# Patient Record
Sex: FEMALE | Race: WHITE | NOT HISPANIC OR LATINO | Employment: FULL TIME | URBAN - METROPOLITAN AREA
[De-identification: names, ages, dates, MRNs, and addresses within clinical notes are randomized per-mention and may not be internally consistent; named-entity substitution may affect disease eponyms.]

---

## 2022-11-03 ENCOUNTER — HOSPITAL ENCOUNTER (EMERGENCY)
Facility: HOSPITAL | Age: 42
Discharge: LEFT BEFORE DISCHARGE | End: 2022-11-03

## 2022-11-03 VITALS
TEMPERATURE: 96.7 F | WEIGHT: 180 LBS | HEART RATE: 83 BPM | SYSTOLIC BLOOD PRESSURE: 127 MMHG | RESPIRATION RATE: 16 BRPM | DIASTOLIC BLOOD PRESSURE: 95 MMHG | OXYGEN SATURATION: 100 %

## 2022-11-03 LAB
ATRIAL RATE: 70 BPM
P AXIS: 56 DEGREES
PR INTERVAL: 136 MS
QRS AXIS: 35 DEGREES
QRSD INTERVAL: 88 MS
QT INTERVAL: 414 MS
QTC INTERVAL: 447 MS
T WAVE AXIS: 14 DEGREES
VENTRICULAR RATE: 70 BPM

## 2022-11-03 NOTE — ED NOTES
ekg performed @ [address] [facility], [facility]  11/03/22 9075

## 2022-11-03 NOTE — ED NOTES
Attempt to call pt back to triage to initiate FNP  Did not answer       Robb Sears RN  11/03/22 [id number]

## 2022-11-03 NOTE — ED NOTES
Pt called multiple times in the waiting room   Pt also called at home, no answer     Pearl Habermann, RN  11/03/22 [id number]

## 2023-06-28 ENCOUNTER — OFFICE VISIT (OUTPATIENT)
Dept: FAMILY MEDICINE CLINIC | Facility: CLINIC | Age: 43
End: 2023-06-28
Payer: COMMERCIAL

## 2023-06-28 VITALS
OXYGEN SATURATION: 96 % | HEIGHT: 64 IN | WEIGHT: 189 LBS | SYSTOLIC BLOOD PRESSURE: 118 MMHG | BODY MASS INDEX: 32.27 KG/M2 | TEMPERATURE: 95 F | DIASTOLIC BLOOD PRESSURE: 74 MMHG | HEART RATE: 63 BPM

## 2023-06-28 DIAGNOSIS — F41.9 ANXIETY AND DEPRESSION: ICD-10-CM

## 2023-06-28 DIAGNOSIS — R10.12 LEFT UPPER QUADRANT ABDOMINAL PAIN: ICD-10-CM

## 2023-06-28 DIAGNOSIS — Z90.3 HISTORY OF SLEEVE GASTRECTOMY: ICD-10-CM

## 2023-06-28 DIAGNOSIS — I72.9 ANEURYSM (HCC): ICD-10-CM

## 2023-06-28 DIAGNOSIS — F32.A ANXIETY AND DEPRESSION: ICD-10-CM

## 2023-06-28 DIAGNOSIS — Z00.00 ANNUAL PHYSICAL EXAM: Primary | ICD-10-CM

## 2023-06-28 PROBLEM — K44.9 HIATAL HERNIA: Status: ACTIVE | Noted: 2023-03-14

## 2023-06-28 PROBLEM — J30.9 ALLERGIC RHINITIS: Status: ACTIVE | Noted: 2023-03-22

## 2023-06-28 PROBLEM — K21.9 GASTROESOPHAGEAL REFLUX DISEASE WITHOUT ESOPHAGITIS: Status: ACTIVE | Noted: 2022-12-12

## 2023-06-28 PROCEDURE — 99386 PREV VISIT NEW AGE 40-64: CPT | Performed by: PHYSICIAN ASSISTANT

## 2023-06-28 RX ORDER — CITALOPRAM 20 MG/1
TABLET ORAL
COMMUNITY
Start: 2023-06-09

## 2023-06-28 RX ORDER — FAMOTIDINE 40 MG/1
40 TABLET, FILM COATED ORAL 2 TIMES DAILY PRN
COMMUNITY
Start: 2023-06-26 | End: 2024-06-25

## 2023-06-28 RX ORDER — CLOPIDOGREL BISULFATE 75 MG/1
75 TABLET ORAL
COMMUNITY
Start: 2022-08-10

## 2023-06-28 RX ORDER — BIOTIN 10 MG
10 TABLET ORAL DAILY
COMMUNITY

## 2023-06-28 RX ORDER — CROMOLYN SODIUM 5.2 MG
1 AEROSOL, SPRAY WITH PUMP (ML) NASAL DAILY
COMMUNITY

## 2023-07-28 ENCOUNTER — TELEPHONE (OUTPATIENT)
Dept: NEUROSURGERY | Facility: CLINIC | Age: 43
End: 2023-07-28

## 2023-07-28 ENCOUNTER — CONSULT (OUTPATIENT)
Dept: NEUROSURGERY | Facility: CLINIC | Age: 43
End: 2023-07-28
Payer: COMMERCIAL

## 2023-07-28 VITALS
DIASTOLIC BLOOD PRESSURE: 68 MMHG | BODY MASS INDEX: 32.44 KG/M2 | WEIGHT: 190 LBS | OXYGEN SATURATION: 98 % | HEIGHT: 64 IN | SYSTOLIC BLOOD PRESSURE: 104 MMHG | TEMPERATURE: 98 F | RESPIRATION RATE: 18 BRPM | HEART RATE: 68 BPM

## 2023-07-28 DIAGNOSIS — I67.9 INTRACRANIAL VASCULAR STENOSIS: ICD-10-CM

## 2023-07-28 DIAGNOSIS — I72.9 ANEURYSM (HCC): Primary | ICD-10-CM

## 2023-07-28 PROCEDURE — 99244 OFF/OP CNSLTJ NEW/EST MOD 40: CPT | Performed by: PHYSICIAN ASSISTANT

## 2023-07-28 RX ORDER — PANTOPRAZOLE SODIUM 40 MG/1
TABLET, DELAYED RELEASE ORAL
COMMUNITY
Start: 2023-07-17

## 2023-07-28 RX ORDER — DIPHENOXYLATE HYDROCHLORIDE AND ATROPINE SULFATE 2.5; .025 MG/1; MG/1
1 TABLET ORAL DAILY
COMMUNITY

## 2023-07-28 RX ORDER — AMOXICILLIN 250 MG
1 CAPSULE ORAL AS NEEDED
COMMUNITY
Start: 2023-04-20 | End: 2024-04-19

## 2023-07-28 RX ORDER — SPIRONOLACTONE 50 MG/1
TABLET, FILM COATED ORAL
COMMUNITY
Start: 2023-05-02

## 2023-07-28 NOTE — LETTER
July 28, 2023    Patient Jazzy Gonzales 1980 is in our office for new patient appointment. Please urgently fax over MRA Head & MRI Brain done 8/12/2019 to [phone].       Thank [address] [place] [address]

## 2023-07-28 NOTE — PROGRESS NOTES
Neurosurgery Office Note  Eri Ross 38 y.o. 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 Geovanna Gore or sooner if she develops worsening symptoms or red flag signs  • Continue plavix for now, will address need to continue this at next visit  • Patient left discs with our office, will need to give those back at her next visit. Information from discs uploaded for review. Diagnoses and all orders for this visit:    Aneurysm ([address])  -     Ambulatory Referral to Neurosurgery  -     Cancel: MRA head w wo contrast; Future  -     CT VENOGRAM BRAIN; Future    Intracranial vascular stenosis  -     Cancel: MRA head w wo contrast; Future  -     CT VENOGRAM BRAIN; Future    Other orders  -     pantoprazole (PROTONIX) 40 mg tablet  -     senna-docusate sodium (SENOKOT S) 8.6-50 mg per tablet;  Take 1 tablet by mouth if needed  -     spironolactone (ALDACTONE) 50 mg tablet  -     multivitamin (THERAGRAN) TABS; Take 1 tablet by mouth daily          I have spent a total time of 40 minutes on 07/28/23 in caring for this patient including Patient and family education, Impressions, Counseling / Coordination of care, Documenting in the medical record, Reviewing / ordering tests, medicine, procedures   and Obtaining or reviewing history  . CHIEF COMPLAINT    Chief Complaint   Patient presents with   • Consult     NP S/P STENT NO IMAGING       HISTORY    History of Present Illness     38y.o. year old female with past medical history significant for intracranial vascular stenosis with venous aneurysm s/p stenting and embolization who presents as a new consultation at the request of her PCP to establish care. Patient states since 2010 she has had a whooshing sensation in her left ear. She saw many specialists and underwent several images and no one was able to figure out what the cause was. 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 She underwent stenting of the sinus as well as stent assisted embolization of the aneurysm on August 5, 2022. Her symptoms resolved after this procedure. She is looking to establish care with neurosurgery closer to home. She offers no new complaints today. She brings discs of imaging with her to be uploaded into the system. See Discussion    REVIEW OF SYSTEMS    Review of Systems   Neurological: Positive for speech difficulty (trouble finding words ). NP S/P STENT NO IMAGING  Chief Complaint: S/P STENT FOR   ANEURYSM/STENOSIS,PLACED 8/2022 IN [facility]  PT STILL ON PLAVIX FOR 1 YR POST SX        Psychiatric/Behavioral: Positive for confusion. All other systems reviewed and are negative. ROS obtained by MA. Reviewed. See HPI.      Meds/Allergies     Current Outpatient Medications   Medication Sig Dispense Refill   • Biotin 10 MG TABS Take 10 mg by mouth daily     • citalopram (CeleXA) 20 mg tablet      • clopidogrel (PLAVIX) 75 mg tablet Take 75 mg by mouth     • cromolyn (NASALCHROM) 5.2 MG/ACT nasal spray 1 spray into each nostril daily     • famotidine (PEPCID) 40 MG tablet Take 40 mg by mouth 2 (two) times a day as needed     • multivitamin (THERAGRAN) TABS Take 1 tablet by mouth daily     • pantoprazole (PROTONIX) 40 mg tablet      • senna-docusate sodium (SENOKOT S) 8.6-50 mg per tablet Take 1 tablet by mouth if needed     • spironolactone (ALDACTONE) 50 mg tablet        No current facility-administered medications for this visit. No Known Allergies    PAST HISTORY    No past medical history on file. Past Surgical History:   Procedure Laterality Date   • GASTRIC BYPASS  2016   • REVISION GASTRIC RESTRICTIVE PROCEDURE FOR MORBID OBESITY  04/2023       Social History     Tobacco Use   • Smoking status: Never     Passive exposure: Never   • Smokeless tobacco: Never   Substance Use Topics   • Alcohol use: Never   • Drug use: Never       No family history on file. Above history personally reviewed. EXAM    Vitals:Blood pressure 104/68, pulse 68, temperature 98 °F (36.7 °C), temperature source Temporal, resp. rate 18, height 5' 4" (1.626 m), weight 86.2 kg (190 lb), SpO2 98 %. ,Body mass index is 32.61 kg/m². Physical Exam  Constitutional:       General: She is awake. Appearance: Normal appearance. HENT:      Head: Normocephalic and atraumatic. Eyes:      Extraocular Movements: Extraocular movements intact and EOM normal.      Conjunctiva/sclera: Conjunctivae normal.   Cardiovascular:      Rate and Rhythm: Normal rate. Pulmonary:      Effort: Pulmonary effort is normal. No respiratory distress. Skin:     General: Skin is warm and dry. Neurological:      Mental Status: She is alert and oriented to person, place, and time. Motor: Motor strength is normal.     Coordination: Finger-Nose-Finger Test normal.      Gait: Gait is intact.    Psychiatric:         Attention and Perception: Attention and perception normal.         Mood and Affect: Mood and affect normal.         Speech: Speech normal. Behavior: Behavior normal. Behavior is cooperative. Thought Content: Thought content normal.         Cognition and Memory: Cognition and memory normal.         Judgment: Judgment normal.         Neurologic Exam     Mental Status   Oriented to person, place, and time. Follows 1 step commands. Attention: normal. Concentration: normal.   Speech: speech is normal   Level of consciousness: alert  Knowledge: good. Normal comprehension. Cranial Nerves     CN III, IV, VI   Extraocular motions are normal.   CN III: no CN III palsy  CN VI: no CN VI palsy  Nystagmus: none   Diplopia: none  Ophthalmoparesis: none  Upgaze: normal  Downgaze: normal  Conjugate gaze: present    CN V   Right facial sensation deficit: none  Left facial sensation deficit: none    CN VII   Right facial weakness: none  Left facial weakness: none    CN VIII   Hearing: intact    CN IX, X   CN IX normal.   CN X normal.     CN XI   Right trapezius strength: normal  Left trapezius strength: normal    CN XII   CN XII normal.     Motor Exam   Muscle bulk: normal  Overall muscle tone: normal  Right arm pronator drift: absent  Left arm pronator drift: absent    Strength   Strength 5/5 throughout. Gait, Coordination, and Reflexes     Gait  Gait: normal    Coordination   Finger to nose coordination: normal    Tremor   Resting tremor: absent  Intention tremor: absent  Action tremor: absent    Reflexes   Right : 2+  Left : 2+        MEDICAL DECISION MAKING    Imaging Studies:     No results found. I have personally reviewed pertinent reports.    and I have personally reviewed pertinent films in PACS

## 2023-07-28 NOTE — TELEPHONE ENCOUNTER
Sent urgent fax to [facility], [facility]. requesting Medical request [phone]    Images uploaded-Jacqueline BARBOZA to bring [facility]     CV cerebral angiogram done 2/17/2020 (* requested reported @ [phone])  MRA Head done 8/12/2019 (faxed request to [address] @ [phone])  MRI Brain 8/12/2019 (faxed request to [address] @ [phone])    Reports received and sent to [facility]

## 2023-07-28 NOTE — ASSESSMENT & PLAN NOTE
· Reports history of aneurysm however location of this is unclear  · States she has coils placed 8/2022 at [facility]; looking to establish care closer to home  · Will order MRA head w wo contrast for further evaluation  · Remain on plavix for now until repeat imaging obtained  · MA working on obtaining hospital records from [facility], to be obtained prior to next visit. Patient will bring in whatever information she may have at home.   · Return to the office in 2-4 weeks upon completion of imaging with AP and endovascular attending or sooner if worsening symptoms or red flag signs  ·

## 2023-07-28 NOTE — ASSESSMENT & PLAN NOTE
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 Destin Quiñones or sooner if she develops worsening symptoms or red flag signs  • Continue plavix for now, will address need to continue this at next visit  • Patient left discs with our office, will need to give those back at her next visit. Information from discs uploaded for review.

## 2023-08-14 ENCOUNTER — HOSPITAL ENCOUNTER (OUTPATIENT)
Dept: CT IMAGING | Facility: CLINIC | Age: 43
Discharge: HOME | End: 2023-08-14
Payer: COMMERCIAL

## 2023-08-14 DIAGNOSIS — I72.9 ANEURYSM (HCC): ICD-10-CM

## 2023-08-14 DIAGNOSIS — I67.9 INTRACRANIAL VASCULAR STENOSIS: ICD-10-CM

## 2023-08-14 PROCEDURE — G1004 CDSM NDSC: HCPCS

## 2023-08-14 PROCEDURE — 70496 CT ANGIOGRAPHY HEAD: CPT

## 2023-08-14 RX ADMIN — IOHEXOL 85 ML: 350 INJECTION, SOLUTION INTRAVENOUS at 11:34

## 2023-08-21 ENCOUNTER — TELEPHONE (OUTPATIENT)
Dept: FAMILY MEDICINE CLINIC | Facility: CLINIC | Age: 43
End: 2023-08-21

## 2023-08-21 NOTE — TELEPHONE ENCOUNTER
Please remove Azalia as PCP. Pt is now under the care of Dr. Ashley Wise with [facility].

## 2023-08-31 PROBLEM — D64.9 ABSOLUTE ANEMIA: Status: ACTIVE | Noted: 2021-01-21

## 2023-08-31 RX ORDER — PROCHLORPERAZINE MALEATE 5 MG/1
TABLET ORAL
COMMUNITY
End: 2023-09-06

## 2023-08-31 RX ORDER — OXYCODONE HYDROCHLORIDE 5 MG/1
TABLET ORAL
COMMUNITY
End: 2023-09-06

## 2023-08-31 NOTE — PROGRESS NOTES
Diagnoses and all orders for this visit:    Encounter for gynecological examination without abnormal finding    Mixed stress and urge urinary incontinence  -     Ambulatory referral to Physical Therapy; Future  -     Urinalysis with microscopic  -     Urine culture    Encounter for screening mammogram for malignant neoplasm of breast  -     Mammo screening bilateral w 3d & cad; Future      Follow up with PT for  Incontinence   Perineal hygiene reviewed   Weight bearing exercises minium of 150 mins/weekly advised. Kegel exercises recommended. SBE encouraged, ASCCP guidelines reviewed. Condoms encouraged with all sexual activity to prevent STI's. Gardisil vaccines recommended up to age 39  Calcium/ Vit D dietary requirements discussed,   Advised to call with any issues,  all concerns & questions addressed. See provided information in your after visit summary     F/U Annually and PRN      Health Maintenance:    Last PAP: 4/7/21 Neg/Neg  Next PAP Due: 2024    Last Mammogram:   8/18/23 @ [facility]  Life time Abbey Escoto % not documented , Density B scattered areas of fibroglandular density , Bi-Rads 1 Negative  Next Mammogram: order given    Last Colonoscopy: Not on file    advised age 41    Gardisil:  Not completed Gardasil 9 was advised for prevention of HPV-related disease. We discussed risks/benefits, SE's/AE's at length and all questions were answered. Written info was provided for review. The patient agrees to consider and is aware she may call to schedule injection #1 at any time. Declines       Subjective    CC: Yearly Exam , New patient      Rica Borges is a 41 y.o. female here for an annual exam. No obstetric history on file. GYN hx includes:  2 c sections,   No personal Hx of breast, cervical, ovarian or colon CA. Family hx of: MGM with BC    Medically stable, reports no changes in medical Hx, follows with PMD    Patient's last menstrual period was 08/28/2023 (exact date).   Her menstrual cycles are regular every 28-30 days. She denies issues with bleeding during her menses. She does have heavy flow d/t Plavix, is hoping to come off of Plavix in the next few weeks, has upcoming follow up appts  Denies history of abnormal pap smear. She denies breast concerns, abnormal vaginal discharge, vaginal itching, odor, irritation, christopher dysfunction, urinary symptoms, pelvic pain, or dyspareunia today. Reports 1 year of urinary frequency, urgency, leaks urine, wears pads. Will send urine for UTI. Advised Salo's daily information provided in AVS   She is sexually active. Monogamous relationship. Her current method of contraception includes  male vasectomy . Denies any issues with her BCM. She does not want STD testing today. Denies intimate partner violence    Elementary      History reviewed. No pertinent past medical history.   Past Surgical History:   Procedure Laterality Date   • GASTRIC BYPASS  2016   • REVISION GASTRIC RESTRICTIVE PROCEDURE FOR MORBID OBESITY  04/2023       Immunization History   Administered Date(s) Administered   • COVID-19 MODERNA VACC 0.5 ML IM 03/23/2021, 04/20/2021, 11/16/2021   • INFLUENZA 10/20/2021, 11/01/2022   • Td (adult), adsorbed 07/01/2016   • Tuberculin Skin Test-PPD Intradermal 03/22/2021       Family History   Problem Relation Age of Onset   • No Known Problems Daughter    • No Known Problems Daughter    • Breast cancer Maternal Grandmother      Social History     Tobacco Use   • Smoking status: Never     Passive exposure: Never   • Smokeless tobacco: Never   Vaping Use   • Vaping Use: Never used   Substance Use Topics   • Alcohol use: Never   • Drug use: Never       Current Outpatient Medications:   •  Biotin 10 MG TABS, Take 10 mg by mouth daily, Disp: , Rfl:   •  citalopram (CeleXA) 20 mg tablet, , Disp: , Rfl:   •  clopidogrel (PLAVIX) 75 mg tablet, Take 75 mg by mouth, Disp: , Rfl:   •  cromolyn (NASALCHROM) 5.2 MG/ACT nasal spray, 1 spray into each nostril daily, Disp: , Rfl:   •  multivitamin (THERAGRAN) TABS, Take 1 tablet by mouth daily, Disp: , Rfl:   •  senna-docusate sodium (SENOKOT S) 8.6-50 mg per tablet, Take 1 tablet by mouth if needed, Disp: , Rfl:   •  spironolactone (ALDACTONE) 50 mg tablet, , Disp: , Rfl:   Patient Active Problem List    Diagnosis Date Noted   • Intracranial vascular stenosis 2023   • Annual physical exam 2023   • Left upper quadrant abdominal pain 2023   • Allergic rhinitis 2023   • Hiatal hernia 2023   • History of sleeve gastrectomy 02/10/2023   • Aneurysm ([address]) 2022   • Gastroesophageal reflux disease without esophagitis 2022   • Anxiety and depression 06/15/2022   • Absolute anemia 2021       No Known Allergies    OB History    Para Term  AB Living   2 0 0 0 0 2   SAB IAB Ectopic Multiple Live Births   0 0 0 0 2      # Outcome Date GA Lbr Chandler/2nd Weight Sex Delivery Anes PTL Lv   2             1                Obstetric Comments     2 x  c- section        Vitals:    23 1100   BP: 118/76   BP Location: Left arm   Patient Position: Sitting   Cuff Size: Large   Weight: 83.5 kg (184 lb)   Height: 5' 4" (1.626 m)     Body mass index is 31.58 kg/m². Review of Systems     Constitutional: Negative for chills, fatigue, fever, headaches, visual disturbances, and unexpected weight change. Respiratory: Negative for cough, & shortness of breath. Cardiovascular: Negative for chest pain. .    Gastrointestinal: Negative for Abd pain, nausea & vomiting, constipation and diarrhea. Genitourinary: Negative for difficulty urinating, dysuria, hematuria, dyspareunia, unusual vaginal bleeding or discharge  Skin: Negative skin changes    Physical Exam     Constitutional: Alert & Oriented x3, well-developed and well-nourished. No distress. HENT: Atraumatic, Normocephalic, Conjunctivae clear  Neck: Normal range of motion. Neck supple.  No thyromegaly, mass, nodules or tenderness  Pulmonary: Effort normal.   Abdominal: Soft. No tenderness or masses  Musculoskeletal: Normal ROM  Skin: Warm & Dry  Psychological: Normal mood, thought content, behavior & judgement     Breasts:   Right: tissue soft without masses, tenderness, skin changes or nipple discharge. No areas of erythema or pain. No subclavicular, axillary, pectoral adenopathy  Left:  tissue soft without masses, tenderness, skin changes or nipple discharge. No areas of erythema or pain. No subclavicular, axillary, pectoral adenopathy    Pelvic exam was performed with patient supine, lithotomy position. Labia: Negative rash, tenderness, lesion or injury on the right labia. Negative rash, tenderness, lesion or injury on the left labia. Tissues are very dry,    Urethral meatus:  Negative for  tenderness, inflammation or discharge. Uterus: not deviated, enlarged, fixed or tender. Cervix: No CMT, no discharge or friability. Right adnexa: no mass, no tenderness and no fullness. Left adnexa: no mass, no tenderness and no fullness. Vagina: No erythema, tenderness, masses, or foreign body in the vagina. No signs of injury around the vagina. No unusual vaginal discharge   Perineum without lesions, signs of injury, erythema or swelling. Inguinal Canal:        Right: No inguinal adenopathy or hernia present. Left: No inguinal adenopathy or hernia present.

## 2023-08-31 NOTE — TELEPHONE ENCOUNTER
08/31/23 3:56 PM        The office's request has been received, reviewed, and the patient chart updated. The PCP has successfully been removed with a patient attribution note. This message will now be completed.         Thank you  Trina Rios

## 2023-08-31 NOTE — PATIENT INSTRUCTIONS
Breast Self Exam for Women   AMBULATORY CARE:   A breast self-exam (BSE)  is a way to check your breasts for lumps and other changes. Regular BSEs can help you know how your breasts normally look and feel. Most breast lumps or changes are not cancer, but you should always have them checked by a healthcare provider. Why you should do a BSE:  Breast cancer is the most common type of cancer in women. Even if you have mammograms, you may still want to do a BSE regularly. If you know how your breasts normally feel and look, it may help you know when to contact your healthcare provider. Mammograms can miss some cancers. You may find a lump during a BSE that did not show up on a mammogram.  When you should do a BSE:  If you have periods, you may want to do your BSE 1 week after your period ends. This is the time when your breasts may be the least swollen, lumpy, or tender. You can do regular BSEs even if you are breastfeeding or have breast implants. Call your doctor if:   You find any lumps or changes in your breasts. You have breast pain or fluid coming from your nipples. You have questions or concerns about your condition or care. How to do a BSE:       Look at your breasts in a mirror. Look at the size and shape of each breast and nipple. Check for swelling, lumps, dimpling, scaly skin, or other skin changes. Look for nipple changes, such as a nipple that is painful or beginning to pull inward. Gently squeeze both nipples and check to see if fluid (that is not breast milk) comes out of them. If you find any of these or other breast changes, contact your healthcare provider. Check your breasts while you sit or  the following 3 positions:    [place] your arms down at your sides. Raise your hands and join them behind your head. Put firm pressure with your hands on your hips. Bend slightly forward while you look at your breasts in the mirror. Lie down and feel your breasts.   When you lie down, your breast tissue spreads out evenly over your chest. This makes it easier for you to feel for lumps and anything that may not be normal for your breasts. Do a BSE on one breast at a time. Place a small pillow or towel under your left shoulder. Put your left arm behind your head. Use the 3 middle fingers of your right hand. Use your fingertip pads, on the top of your fingers. Your fingertip pad is the most sensitive part of your finger. Use small circles to feel your breast tissue. Use your fingertip pads to make dime-sized, overlapping circles on your breast and armpits. Use light, medium, and firm pressure. First, press lightly. Second, press with medium pressure to feel a little deeper into the breast. Last, use firm pressure to feel deep within your breast.    Examine your entire breast area. Examine the breast area from above the breast to below the breast where you feel only ribs. Make small circles with your fingertips, starting in the middle of your armpit. Make circles going up and down the breast area. Continue toward your breast and all the way across it. Examine the area from your armpit all the way over to the middle of your chest (breastbone). Stop at the middle of your chest.    Move the pillow or towel to your right shoulder, and put your right arm behind your head. Use the 3 fingertip pads of your left hand, and repeat the above steps to do a BSE on your right breast.  What else you can do to check for breast problems or cancer:  Talk to your healthcare provider about mammograms. A mammogram is an x-ray of your breasts to screen for breast cancer or other problems. Your provider can tell you the benefits and risks of mammograms. The first mammogram is usually at age 39 or 48. Your provider may recommend you start at 36 or younger if your risk for breast cancer is high. Mammograms usually continue every 1 to 2 years until age 76.        Follow up with your doctor as directed:  Write down your questions so you remember to ask them during your visits. © Copyright Kelly Loges 2022 Information is for End User's use only and may not be sold, redistributed or otherwise used for commercial purposes. The above information is an  only. It is not intended as medical advice for individual conditions or treatments. Talk to your doctor, nurse or pharmacist before following any medical regimen to see if it is safe and effective for you. Wellness Visit for Adults   AMBULATORY CARE:   A wellness visit  is when you see your healthcare provider to get screened for health problems. Your healthcare provider will also give you advice on how to stay healthy. Write down your questions so you remember to ask them. Ask your healthcare provider how often you should have a wellness visit. What happens at a wellness visit:  Your healthcare provider will ask about your health, and your family history of health problems. This includes high blood pressure, heart disease, and cancer. He or she will ask if you have symptoms that concern you, if you smoke, and about your mood. You may also be asked about your intake of medicines, supplements, food, and alcohol. Any of the following may be done: Your weight  will be checked. Your height may also be checked so your body mass index (BMI) can be calculated. Your BMI shows if you are at a healthy weight. Your blood pressure  and heart rate will be checked. Your temperature may also be checked. Blood and urine tests  may be done. Blood tests may be done to check your cholesterol levels. Abnormal cholesterol levels increase your risk for heart disease and stroke. You may also need a blood or urine test to check for diabetes if you are at increased risk. Urine tests may be done to look for signs of an infection or kidney disease. A physical exam  includes checking your heartbeat and lungs with a stethoscope.  Your healthcare provider may also check your skin to look for sun damage. Screening tests  may be recommended. A screening test is done to check for diseases that may not cause symptoms. The screening tests you may need depend on your age, gender, family history, and lifestyle habits. For example, colorectal screening may be recommended if you are 48years old or older. Screening tests you need if you are a woman:   A Pap smear  is used to screen for cervical cancer. Pap smears are usually done every 3 to 5 years depending on your age. You may need them more often if you have had abnormal Pap smear test results in the past. Ask your healthcare provider how often you should have a Pap smear. A mammogram  is an x-ray of your breasts to screen for breast cancer. Experts recommend mammograms every 2 years starting at age 48 years. You may need a mammogram at age 52 years or younger if you have an increased risk for breast cancer. Talk to your healthcare provider about when you should start having mammograms and how often you need them. Vaccines you may need:   Get an influenza vaccine  every year. The influenza vaccine protects you from the flu. Several types of viruses cause the flu. The viruses change over time, so new vaccines are made each year. Get a tetanus-diphtheria (Td) booster vaccine  every 10 years. This vaccine protects you against tetanus and diphtheria. Tetanus is a severe infection that may cause painful muscle spasms and lockjaw. Diphtheria is a severe bacterial infection that causes a thick covering in the back of your mouth and throat. Get a human papillomavirus (HPV) vaccine  if you are female and aged 23 to 32 or male 23 to 24 and never received it. This vaccine protects you from HPV infection. HPV is the most common infection spread by sexual contact. HPV may also cause vaginal, penile, and anal cancers. Get a pneumococcal vaccine  if you are aged 72 years or older.  The pneumococcal vaccine is an injection given to protect you from pneumococcal disease. Pneumococcal disease is an infection caused by pneumococcal bacteria. The infection may cause pneumonia, meningitis, or an ear infection. Get a shingles vaccine  if you are 60 or older, even if you have had shingles before. The shingles vaccine is an injection to protect you from the varicella-zoster virus. This is the same virus that causes chickenpox. Shingles is a painful rash that develops in people who had chickenpox or have been exposed to the virus. How to eat healthy:  My Plate is a model for planning healthy meals. It shows the types and amounts of foods that should go on your plate. Fruits and vegetables make up about half of your plate, and grains and protein make up the other half. A serving of dairy is included on the side of your plate. The amount of calories and serving sizes you need depends on your age, gender, weight, and height. Examples of healthy foods are listed below:  Eat a variety of vegetables  such as dark green, red, and orange vegetables. You can also include canned vegetables low in sodium (salt) and frozen vegetables without added butter or sauces. Eat a variety of fresh fruits , canned fruit in 100% juice, frozen fruit, and dried fruit. Include whole grains. At least half of the grains you eat should be whole grains. Examples include whole-wheat bread, wheat pasta, brown rice, and whole-grain cereals such as oatmeal.    Eat a variety of protein foods such as seafood (fish and shellfish), lean meat, and poultry without skin (turkey and chicken). Examples of lean meats include pork leg, shoulder, or tenderloin, and beef round, sirloin, tenderloin, and extra lean ground beef. Other protein foods include eggs and egg substitutes, beans, peas, soy products, nuts, and seeds. Choose low-fat dairy products such as skim or 1% milk or low-fat yogurt, cheese, and cottage cheese. Limit unhealthy fats  such as butter, hard margarine, and shortening. Exercise:  Exercise at least 30 minutes per day on most days of the week. Some examples of exercise include walking, biking, dancing, and swimming. You can also fit in more physical activity by taking the stairs instead of the elevator or parking farther away from stores. Include muscle strengthening activities 2 days each week. Regular exercise provides many health benefits. It helps you manage your weight, and decreases your risk for type 2 diabetes, heart disease, stroke, and high blood pressure. Exercise can also help improve your mood. Ask your healthcare provider about the best exercise plan for you. General health and safety guidelines:   Do not smoke. Nicotine and other chemicals in cigarettes and cigars can cause lung damage. Ask your healthcare provider for information if you currently smoke and need help to quit. E-cigarettes or smokeless tobacco still contain nicotine. Talk to your healthcare provider before you use these products. Limit alcohol. A drink of alcohol is 12 ounces of beer, 5 ounces of wine, or 1½ ounces of liquor. Lose weight, if needed. Being overweight increases your risk of certain health conditions. These include heart disease, high blood pressure, type 2 diabetes, and certain types of cancer. Protect your skin. Do not sunbathe or use tanning beds. Use sunscreen with a SPF 15 or higher. Apply sunscreen at least 15 minutes before you go outside. Reapply sunscreen every 2 hours. Wear protective clothing, hats, and sunglasses when you are outside. Drive safely. Always wear your seatbelt. Make sure everyone in your car wears a seatbelt. A seatbelt can save your life if you are in an accident. Do not use your cell phone when you are driving. This could distract you and cause an accident. Pull over if you need to make a call or send a text message. Practice safe sex. Use latex condoms if are sexually active and have more than one partner.  Your healthcare provider may recommend screening tests for sexually transmitted infections (STIs). Wear helmets, lifejackets, and protective gear. Always wear a helmet when you ride a bike or motorcycle, go skiing, or play sports that could cause a head injury. Wear protective equipment when you play sports. Wear a lifejacket when you are on a boat or doing water sports. © Copyright Thana Givens 2022 Information is for End User's use only and may not be sold, redistributed or otherwise used for commercial purposes. The above information is an  only. It is not intended as medical advice for individual conditions or treatments. Talk to your doctor, nurse or pharmacist before following any medical regimen to see if it is safe and effective for you. HPV (Human Papillomavirus)   AMBULATORY CARE:   Human Papillomavirus (HPV)  is the name for a group of viruses that can infect your skin or other parts of your body. HPV is the most common infection spread by sexual contact. It can also be spread from a mother to her baby during delivery. Common symptoms include the following:   Painless warts in your mouth or on your genitals    Genital or anal discharge, bleeding, itching, or pain    Pain when you urinate    Call your doctor if:   You have new or worsening symptoms. You have questions or concerns about your condition or care. HPV diagnosis:  Your healthcare provider may use a vinegar liquid to help diagnose HPV genital warts. Women 27to 72years old can be checked for HPV during regular cervical cancer screenings. An HPV test checks for certain types of HPV that can cause changes in cervical cells. Without treatment, the changed cells can become cancer. An HPV test can be done every 5 years if the results show no infection. The test can be done with or without a Pap smear. A Pap smear checks for cancer or for abnormal cells that can become cancer.  You may be tested for HPV if you have mouth or throat cancer. Treatment:  HPV cannot be cured, but an infection may go away on its own in about 2 years without causing problems. If the infection continues, some types of HPV can lead to health conditions that need to be treated. Examples include warts and certain cancers, especially squamous cell carcinoma (SCC). HPV-linked SCCs commonly develop in the anus, throat (called oropharyngeal cancer), cervix, vagina, penis, or mouth. HPV can also cause a type of cervical cancer called adenocarcinoma. Symptoms of any of these conditions may not develop for several years after you were exposed to HPV. You will need to be monitored closely. Ask your healthcare provider for more information about monitoring, conditions caused by HPV, and available treatments. Prevent an HPV infection:  HPV is usually spread through sexual activity. The following can help prevent infection:  Ask about the HPV vaccine. The HPV vaccine is given to females and males, usually at 6or 15years of age. It can be given from 9 years through 39years of age, if needed. It is most effective if given before sexual activity begins. Use a new condom, contraceptive barrier, or dental dam each time you have sex. This includes oral, vaginal, and anal sex. Talk to your healthcare provider if you have any questions about what to use or how to use it. Follow up with your doctor as directed:  Write down your questions so you remember to ask them during your visits. © Copyright Delia Mcelroy 2022 Information is for End User's use only and may not be sold, redistributed or otherwise used for commercial purposes. The above information is an  only. It is not intended as medical advice for individual conditions or treatments. Talk to your doctor, nurse or pharmacist before following any medical regimen to see if it is safe and effective for you.   Kegel Exercises for Women   AMBULATORY CARE:   Kegel exercises  help strengthen your pelvic muscles. Pelvic muscles hold your pelvic organs, such as your bladder and uterus, in place. Kegel exercises help prevent or control certain conditions, such as urine incontinence (leakage) or uterine prolapse. Call your doctor or physical therapist if:   You cannot feel your muscles tighten or relax. You continue to leak urine. You have questions or concerns about your condition or care. Use the correct muscles:  Pelvic muscles are the muscles you use to control urine flow. To target these muscles, stop and start the flow of urine several times. This will help you become familiar with how it feels to tighten and relax these muscles. How to do Kegel exercises:   Get into a comfortable position. You may lie down, stand up, or sit down to do these exercises. When you first try to do these exercises, it may be easier if you lie down. Tighten or squeeze your pelvic muscles slowly. It may feel like you are trying to hold back urine or gas. Hold this position for 3 seconds. Relax for 3 seconds. Repeat this cycle 10 times. Do not hold your breath when you do Kegel exercises. Keep your stomach, back, and leg muscles relaxed. Do 10 sets of Kegel exercises, at least 3 times a day. When you know how to do Kegel exercises, use different positions. This will help to strengthen your pelvic muscles as much as possible. You can do these exercises while you lie on the floor, watch TV, or while you stand. Tighten your pelvic muscles before you sneeze, cough, or lift to prevent urine leakage. You may notice improved bladder control within about 6 weeks. Follow up with your doctor or physical therapist as directed:  Write down your questions so you remember to ask them during your visits. © Copyright Kaia Velásquez 2022 Information is for End User's use only and may not be sold, redistributed or otherwise used for commercial purposes. The above information is an  only.  It is not intended as medical advice for individual conditions or treatments. Talk to your doctor, nurse or pharmacist before following any medical regimen to see if it is safe and effective for you. Perineal Hygiene      Your vaginal naturally takes care of its self, it is a self washing system, the less you mess the healthier it will be     No soaps or feminine wash to the vulva, these products can cause dermitis, bacterial infections and other vulvar problems. Use only water to cleanse, or water with Dove or [employer] if necessary. No scented lotions or products are advised in or near your vulva. Use only coconut oil for moisture if needed. No douching this may cause imbalance in your vaginal PH and further issues. If you wear panty liners, you may apply a thin coating of Vaseline, A&D ointment or coconut oil to the vulvar tissues as a skin barrier     [place] underware, loose fitting clothing  Only perfume-free, dye-free laundry detergent, use a second rinse cycle   Avoid fabric softeners/dryer sheets. Partner should avoid the same products as well. Over the counter probiotic to restore vaginal dylon may be helpful as well, take daily. You may also look into Boric Acid vaginal suppositories to restore vaginal PH balance for up to 2 weeks as directed on the box. You may not use these if you are pregnant      For vaginal dryness: You may use:     Coconut oil (organic, pure, unscented) as needed for moisture or lubrication. ( Do not use if allergic)       Replens moisture restore external comfort gel daily ( use as directed on the box)        Replens long lasting vaginal moisturizer  ( use as directed on the box)         For Vaginal Lubrication:          You may use:     Coconut oil (organic, pure, unscented) as a lubricant or another scent-free lubricant (Astroglide, Uberlube) if needed.   Do not use coconut oil or silicone if using a condom as this may break down the integrity of the condom and cause an unplanned pregnancy              Do not use coconut oil if allergic               Replens silky smooth lubricant, premium silicone based lubricant for intercourse. ( use as directed, a small amount will provide an enhanced natural feeling)     Any premium over the counter vaginal lubricant water or silicone based. Silicone based will have more staying power. Prophylactic NSAID therapy for Painful or Heavy menses     Ibuprofen or Naproxen (chose 1 or the other, do not take both), Dose as noted on the box. Typically Ibuprofen dose is 600 mg, (3 tablets) every 6-8 hours. Typically Naproxen dose is 500 mg every 12 hours. Start taking medication 2 days prior to onset of menses and continue taking through the first 3 days of menses. Make sure you take consistently this is important  You need to take with food to decrease any gastrointestinal upset effects    This is proven therapy to reduce you flow and cramping by 50 %    Life style changes that have a positive effect on painful and heavy periods are as follows   Daily physical exercise    Increase fiber, fresh fruits and vegetables in your diet    Increase daily water intake    Heating pads(do not apply directly to skin, apply over clothing or towel)   Warm Baths   Relaxation techniques, meditation, massage, yoga and mindfulness     These are all suggestion for improving your sense of frustrations with your menstrual cycle and improving your overall wellness and lifestyle     Urinary Incontinence   WHAT YOU NEED TO KNOW:   What is urinary incontinence (UI)? Urinary incontinence (UI) is when you lose control of your bladder. UI develops because your bladder cannot store or empty urine properly. The 3 most common types of UI are stress incontinence, urge incontinence, or both. What are the signs and symptoms of UI? You feel like your bladder does not empty completely when you urinate.     You urinate often and need to urinate immediately. You leak urine when you sleep, or you wake up with the urge to urinate. You leak urine when you cough, sneeze, exercise, or laugh. How is UI treated? Medicines  can help strengthen your bladder control. Electrical stimulation  is used to send a small amount of electrical energy to your pelvic floor muscles. This helps control your bladder function. Electrodes may be placed outside your body or in your rectum. For women, the electrodes may be placed in the vagina. A bulking agent  may be injected into the wall of your urethra to make it thicker. This helps keep your urethra closed and decreases urine leakage. Devices  such as a clamp, pessary, or tampon may help stop urine leaks. Ask your healthcare provider for more information about these and other devices. Surgery  may be needed if other treatments do not work. Several types of surgery can help improve your bladder control. Ask your healthcare provider for more information about the surgery you may need. How can I manage my symptoms? Do pelvic muscle exercises often. Your pelvic muscles help you stop urinating. Squeeze these muscles tight for 5 seconds, then relax for 5 seconds. Gradually work up to squeezing for 10 seconds. Do 3 sets of 15 repetitions a day, or as directed. This will help strengthen your pelvic muscles and improve bladder control. Keep a UI record. Write down how often you leak urine and how much you leak. Make a note of what you were doing when you leaked urine. Train your bladder. Go to the bathroom at set times, such as every 2 hours, even if you do not feel the urge to go. You can also try to hold your urine when you feel the urge to go. For example, hold your urine for 5 minutes when you feel the urge to go. As that becomes easier, hold your urine for 10 minutes. Drink liquids as directed.   Ask your healthcare provider how much liquid to drink each day and which liquids are best for you. You may need to limit the amount of liquid you drink to help control your urine leakage. Do not drink any liquid right before you go to bed. Limit or do not have drinks that contain caffeine or alcohol. Prevent constipation. Eat a variety of high-fiber foods. Good examples are high-fiber cereals, beans, vegetables, and whole-grain breads. Prune juice may help make your bowel movement softer. Walking is the best way to trigger your intestines to have a bowel movement. Exercise regularly and maintain a healthy weight. Ask your healthcare provider how much you should weigh and about the best exercise plan for you. Weight loss and exercise will decrease pressure on your bladder and help you control your leakage. Ask him or her to help you create a weight loss plan if you are overweight. Use a catheter as directed  to help empty your bladder. A catheter is a tiny, plastic tube that is put into your bladder to drain your urine. Your healthcare provider may tell you to use a catheter to prevent your bladder from getting too full and leaking urine. Go to behavior therapy as directed. Behavior therapy may be used to help you learn to control your urge to urinate. When should I call my doctor? You have severe pain. You are confused or cannot think clearly. You have a fever. You see blood in your urine. You have pain when you urinate. You have new or worse pain, even after treatment. Your mouth feels dry or you have vision changes. Your urine is cloudy or smells bad. You have questions or concerns about your condition or care.       We discussed UTI prevention at length: Vitamin C, probiotics, cranberry supplements, AZO, D-mannose, avoid bladder irritants (coffee, soda, tea, alcohol), avoid smoking, avoid holding bladder for prolonged periods of time, void immediately before and after sexual activity, Replens or coconut oil for vaginal dryness, hydrate with at least 64 oz water daily.

## 2023-09-06 ENCOUNTER — OFFICE VISIT (OUTPATIENT)
Dept: OBGYN CLINIC | Facility: CLINIC | Age: 43
End: 2023-09-06
Payer: COMMERCIAL

## 2023-09-06 VITALS
BODY MASS INDEX: 31.41 KG/M2 | HEIGHT: 64 IN | SYSTOLIC BLOOD PRESSURE: 118 MMHG | DIASTOLIC BLOOD PRESSURE: 76 MMHG | WEIGHT: 184 LBS

## 2023-09-06 DIAGNOSIS — N39.46 MIXED STRESS AND URGE URINARY INCONTINENCE: ICD-10-CM

## 2023-09-06 DIAGNOSIS — Z12.31 ENCOUNTER FOR SCREENING MAMMOGRAM FOR MALIGNANT NEOPLASM OF BREAST: ICD-10-CM

## 2023-09-06 DIAGNOSIS — Z01.419 ENCOUNTER FOR GYNECOLOGICAL EXAMINATION WITHOUT ABNORMAL FINDING: Primary | ICD-10-CM

## 2023-09-06 LAB
BACTERIA UR QL AUTO: NORMAL /HPF
BILIRUB UR QL STRIP: NEGATIVE
CLARITY UR: CLEAR
COLOR UR: NORMAL
GLUCOSE UR STRIP-MCNC: NEGATIVE MG/DL
HGB UR QL STRIP.AUTO: NEGATIVE
KETONES UR STRIP-MCNC: NEGATIVE MG/DL
LEUKOCYTE ESTERASE UR QL STRIP: NEGATIVE
NITRITE UR QL STRIP: NEGATIVE
NON-SQ EPI CELLS URNS QL MICRO: NORMAL /HPF
PH UR STRIP.AUTO: 7 [PH]
PROT UR STRIP-MCNC: NEGATIVE MG/DL
RBC #/AREA URNS AUTO: NORMAL /HPF
SP GR UR STRIP.AUTO: 1.01 (ref 1–1.03)
UROBILINOGEN UR STRIP-ACNC: <2 MG/DL
WBC #/AREA URNS AUTO: NORMAL /HPF

## 2023-09-06 PROCEDURE — S0610 ANNUAL GYNECOLOGICAL EXAMINA: HCPCS | Performed by: OBSTETRICS & GYNECOLOGY

## 2023-09-06 PROCEDURE — 87086 URINE CULTURE/COLONY COUNT: CPT | Performed by: OBSTETRICS & GYNECOLOGY

## 2023-09-06 PROCEDURE — 81001 URINALYSIS AUTO W/SCOPE: CPT | Performed by: OBSTETRICS & GYNECOLOGY

## 2023-09-06 NOTE — PROGRESS NOTES
C/ sections   LMP: 2023  PMB:  SA:  YES   HPV:NO   Birth control:  Vasectomy   Last pap: Not on file  Last mammo: Not on file:  Last colonoscopy: Not on file  Last Dexa: Not on file  Family History:    PGM- breast cancer (D)

## 2023-09-08 LAB — BACTERIA UR CULT: NORMAL

## 2023-09-11 ENCOUNTER — TELEPHONE (OUTPATIENT)
Dept: NEUROSURGERY | Facility: CLINIC | Age: 43
End: 2023-09-11

## 2023-09-11 ENCOUNTER — OFFICE VISIT (OUTPATIENT)
Dept: NEUROSURGERY | Facility: CLINIC | Age: 43
End: 2023-09-11
Payer: COMMERCIAL

## 2023-09-11 VITALS
DIASTOLIC BLOOD PRESSURE: 78 MMHG | HEART RATE: 58 BPM | HEIGHT: 64 IN | SYSTOLIC BLOOD PRESSURE: 120 MMHG | TEMPERATURE: 97.9 F | WEIGHT: 184 LBS | BODY MASS INDEX: 31.41 KG/M2 | OXYGEN SATURATION: 99 %

## 2023-09-11 DIAGNOSIS — I72.9 ANEURYSM (HCC): Primary | ICD-10-CM

## 2023-09-11 DIAGNOSIS — I67.9 INTRACRANIAL VASCULAR STENOSIS: ICD-10-CM

## 2023-09-11 PROCEDURE — 99214 OFFICE O/P EST MOD 30 MIN: CPT | Performed by: NEUROLOGICAL SURGERY

## 2023-09-11 NOTE — TELEPHONE ENCOUNTER
9/11/23 patient will call neurosurgery for a follow up after she obtains her Venogram study from [facility] as per Provider Emerita at check out on 9/11/23. Patient was provided medical records phone number to obtain disc that she needs from [facility]. Disc that were here at the office was returned to patient.

## 2023-09-11 NOTE — PROGRESS NOTES
Assessment/Plan:    Aneurysm ([address])  As addressed in HPI    Intracranial vascular stenosis  As addressed in HPI    8/14/23 CT Venogram Brain Status post left transverse/sigmoid sinus stent placement and endovascular coiling of the venous aneurysm. Beam-hardening artifact emanates from the venous coil. Along the anterior margin of the stent, at the junction of the transverse and sigmoid sinus,  there is venous narrowing although the venous sinus remains patent    Plan   Dr Parvin Alan met with patient advised   · She continue with orderes as per vascular surgeon,  Dr. Frank Vergara from [facility]. · Contact this office prn to re-establish care prn If she is no longer under care with the original surgeon or in and emergency situation report to the closest ED department . · Reviewed Red flag symptoms. , if develop report to closest ED         Subjective: Returns after initial appointment 7/28/23 to establish care with a neurovascular surgeon closer to home. CT venogram ordered     Patient ID: Valdemar Jensen is a 41 y.o. 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 As per consult provider notes since 2010 she has had a whooshing sensation in her left ear. She saw many specialists and underwent several images and no one was able to figure out what the cause was.    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 She underwent stenting of the sinus as well as stent assisted embolization of the aneurysm on August 5, 2022. Her symptoms resolved after this procedure. She is looking to establish care with neurosurgery closer to home. Today this visit she reports    Continue with whooshing in the left ear , intermittent episodes   Noise resolved after the surgery in 2022 the around June 2023 started again. The whooshing sound is minor compared to preoperative. Inciting factors for whooshing sound in the left ear are stress, certain positions,, tilting head to left and exertional.   Reports episodes of blurry vision or feeling of wanting to pass, causing her to want to sit down and wait ithe sensation out. The sensation occurs more in the evening and usually when standing. She mentioned being in contact with the surgeon  Dr. Alma Zaidi from [facility], and questioned if she can stop plavix. He advised before decision made to stop plavix and with current symptoms , ordered an MRV and scheduled a f/u Tele medicine appointment for October 18 2023      A CT venogram was ordered  For updated assessment . 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 HENT: Negative. Eyes: Positive for visual disturbance (blur vision occasionlly). Respiratory: Negative. Cardiovascular: Negative. Gastrointestinal: Negative. Endocrine: Negative. Genitourinary: Negative. Musculoskeletal: Positive for neck pain (right sidr base of the neck near the shoulder) and neck stiffness. Neurological: Positive for syncope (feel like she might faint occasionally), speech difficulty (trouble finding words ) and headaches (occasionlly).  Negative for dizziness, seizures and numbness. NP S/P STENT NO IMAGING  Chief Complaint: S/P STENT FOR   ANEURYSM/STENOSIS,[facility]  PT STILL ON PLAVIX FOR 1 YR POST SX        Psychiatric/Behavioral: Positive for confusion (sometimes). The patient is nervous/anxious (occasionlly). All other systems reviewed and are negative. Meds/Allergies     Current Outpatient Medications   Medication Sig Dispense Refill   • Biotin 10 MG TABS Take 10 mg by mouth daily     • citalopram (CeleXA) 20 mg tablet      • clopidogrel (PLAVIX) 75 mg tablet Take 75 mg by mouth     • cromolyn (NASALCHROM) 5.2 MG/ACT nasal spray 1 spray into each nostril daily     • multivitamin (THERAGRAN) TABS Take 1 tablet by mouth daily     • senna-docusate sodium (SENOKOT S) 8.6-50 mg per tablet Take 1 tablet by mouth if needed     • spironolactone (ALDACTONE) 50 mg tablet        No current facility-administered medications for this visit. No Known Allergies    PAST HISTORY    History reviewed. No pertinent past medical history.     Past Surgical History:   Procedure Laterality Date   • GASTRIC BYPASS  2016   • REVISION GASTRIC RESTRICTIVE PROCEDURE FOR MORBID OBESITY  04/2023       Social History     Tobacco Use   • Smoking status: Never     Passive exposure: Never   • Smokeless tobacco: Never   Vaping Use   • Vaping Use: Never used   Substance Use Topics   • Alcohol use: Never   • Drug use: Never       Family History   Problem Relation Age of Onset   • No Known Problems Daughter    • No Known Problems Daughter    • Breast cancer Maternal Grandmother        The following portions of the patient's history were reviewed and updated as appropriate: allergies, current medications, past family history, past medical history, past social history, past surgical history and problem list.      EXAM    Vitals:Blood pressure 120/78, pulse 58, temperature 97.9 °F (36.6 °C), temperature source Temporal, height 5' 4" (1.626 m), weight 83.5 kg (184 lb), last menstrual period 08/28/2023, SpO2 99 %. ,Body mass index is 31.58 kg/m². Physical Exam  Vitals and nursing note reviewed. Constitutional:       General: She is not in acute distress. Appearance: Normal appearance. She is not ill-appearing, toxic-appearing or diaphoretic. HENT:      Head: Normocephalic and atraumatic. Pulmonary:      Effort: Pulmonary effort is normal. No respiratory distress. Musculoskeletal:      Right lower leg: No edema. Left lower leg: No edema. Skin:     General: Skin is warm and dry. Neurological:      General: No focal deficit present. Mental Status: She is alert and oriented to person, place, and time. Sensory: No sensory deficit. Motor: Motor strength is normal.No weakness. Coordination: Coordination normal.      Gait: Gait is intact. Gait normal.      Deep Tendon Reflexes: Reflexes normal.   Psychiatric:         Mood and Affect: Mood normal.         Behavior: Behavior normal.         Neurologic Exam     Mental Status   Oriented to person, place, and time. Level of consciousness: alert    Motor Exam     Strength   Strength 5/5 throughout. Sensory Exam   Light touch normal.     Gait, Coordination, and Reflexes     Gait  Gait: normal      Imaging Studies    CT VENOGRAM BRAIN    Result Date: 8/17/2023  Narrative: CTV BRAIN - WITH AND WITHOUT CONTRAST INDICATION: I72.9: Aneurysm of unspecified site I67.9: Cerebrovascular disease, unspecified history of pulsatile tinnitus with venous aneurysm at the junction of the sigmoid and left transverse sinus treated with coiling and venous stent placement. COMPARISON:   None. TECHNIQUE:  Routine noncontrast CT brain followed by axial 0.625 mm imaging after administration of intravenous contrast.  MIP and 3D reconstructions performed. 3D rendering was performed on an independent workstation. Radiation dose length product (DLP) for this visit:  6185 mGy-cm .   This examination, like all CT scans performed in the [facility], was performed utilizing techniques to minimize radiation dose exposure, including the use of iterative reconstruction and automated exposure control. IV Contrast:  85 mL of iohexol (OMNIPAQUE) IMAGE QUALITY: Beam-hardening artifact from endovascular coiling. FINDINGS: NONCONTRAST BRAIN: PARENCHYMA:  No intracranial mass, mass effect or midline shift. No CT signs of acute infarction. No acute parenchymal hemorrhage. VENTRICLES AND EXTRA-AXIAL SPACES:  Normal for the patient's age. VISUALIZED ORBITS: Normal visualized orbits. PARANASAL SINUSES: Normal visualized paranasal sinuses. VASCULATURE: DURAL VENOUS SINUSES: Status post stent placement beginning in the lateral aspect of the left transverse sinus extending to the transverse and sigmoid junction on the left with placement of endovascular venous coil. Flow is maintained through the stent device. Distal/anterior to the stent, at the junction of the system transverse and sigmoid sinus, there is vascular narrowing. ARTERIAL STRUCTURES: Limited evaluation of the arterial structures secondary to venous bolus timing. No arterial abnormality identified. BONY STRUCTURES:  Unremarkable bony structures. Impression: Status post left transverse/sigmoid sinus stent placement and endovascular coiling of the venous aneurysm. Beam-hardening artifact emanates from the venous coil. Along the anterior margin of the stent, at the junction of the transverse and sigmoid sinus,  there is venous narrowing although the venous sinus remains patent. Workstation performed: [id number]       I have personally reviewed pertinent reports.    and I have personally reviewed pertinent films in PACS    I have spent a total time of 30 minutes on 09/11/23 in caring for this patient including Diagnostic results, Risks and benefits of tx options, Instructions for management, Patient and family education, Importance of tx compliance, Risk factor reductions, Impressions, Counseling / Coordination of care, Documenting in the medical record, Reviewing / ordering tests, medicine, procedures  , Obtaining or reviewing history   and Communicating with other healthcare professionals .

## 2023-09-12 NOTE — ASSESSMENT & PLAN NOTE
As addressed in HPI    8/14/23 CT Venogram Brain Status post left transverse/sigmoid sinus stent placement and endovascular coiling of the venous aneurysm. Beam-hardening artifact emanates from the venous coil. Along the anterior margin of the stent, at the junction of the transverse and sigmoid sinus,  there is venous narrowing although the venous sinus remains patent    Plan   Dr Angel Jung met with patient advised   · She continue with orderes as per vascular surgeon,  Dr. Echo Templeton from [facility]. · Contact this office prn to re-establish care prn If she is no longer under care with the original surgeon or in and emergency situation report to the closest ED department . · Reviewed Red flag symptoms.  , if develop report to closest ED

## 2023-09-28 ENCOUNTER — HOSPITAL ENCOUNTER (OUTPATIENT)
Dept: MRI IMAGING | Facility: HOSPITAL | Age: 43
End: 2023-09-28
Payer: COMMERCIAL

## 2023-09-28 DIAGNOSIS — H93.A9 PULSATILE TINNITUS: ICD-10-CM

## 2023-09-28 DIAGNOSIS — Z00.00 ENCOUNTER FOR PREVENTIVE HEALTH EXAMINATION: ICD-10-CM

## 2023-09-28 PROCEDURE — A9585 GADOBUTROL INJECTION: HCPCS | Performed by: RADIOLOGY

## 2023-09-28 PROCEDURE — 70546 MR ANGIOGRAPH HEAD W/O&W/DYE: CPT

## 2023-09-28 PROCEDURE — 70544 MR ANGIOGRAPHY HEAD W/O DYE: CPT

## 2023-09-28 PROCEDURE — G1004 CDSM NDSC: HCPCS

## 2023-09-28 RX ORDER — GADOBUTROL 604.72 MG/ML
8 INJECTION INTRAVENOUS
Status: COMPLETED | OUTPATIENT
Start: 2023-09-28 | End: 2023-09-28

## 2023-09-28 RX ADMIN — GADOBUTROL 8 ML: 604.72 INJECTION INTRAVENOUS at 14:45

## 2023-10-25 ENCOUNTER — TELEPHONE (OUTPATIENT)
Dept: FAMILY MEDICINE CLINIC | Facility: CLINIC | Age: 43
End: 2023-10-25

## 2023-10-25 NOTE — TELEPHONE ENCOUNTER
Pt scheduled appt thru grantt with Chne Montoya had been removed as pcp by care [facility] and Dr Collin Reese listed as pcp, however, this was incorrect -- verified with pt she established care with [facility] in June 2023 and has not been back to Dr Collin Reese. Pt will not be returning to Dr Collin Reese and is no longer her pt.   Corrected date/pcp assignment to [place].

## 2023-10-30 ENCOUNTER — TELEPHONE (OUTPATIENT)
Dept: FAMILY MEDICINE CLINIC | Facility: CLINIC | Age: 43
End: 2023-10-30

## 2023-10-30 NOTE — TELEPHONE ENCOUNTER
Pt scheduled thru my chart for 11/8 - s/s dizziness, hot & heart palpitations - called to move her appt up - can't leave a message, mailbox is full.

## 2024-09-09 ENCOUNTER — HOSPITAL ENCOUNTER (OUTPATIENT)
Age: 44
Discharge: HOME | End: 2024-09-09
Payer: COMMERCIAL

## 2024-09-09 DIAGNOSIS — Z12.31 ENCOUNTER FOR SCREENING MAMMOGRAM FOR MALIGNANT NEOPLASM OF BREAST: ICD-10-CM

## 2024-09-09 PROCEDURE — 77067 SCR MAMMO BI INCL CAD: CPT

## 2024-09-09 PROCEDURE — 77063 BREAST TOMOSYNTHESIS BI: CPT

## 2024-09-19 ENCOUNTER — OFFICE VISIT (OUTPATIENT)
Dept: FAMILY MEDICINE CLINIC | Facility: CLINIC | Age: 44
End: 2024-09-19
Payer: COMMERCIAL

## 2024-09-19 VITALS
BODY MASS INDEX: 31.41 KG/M2 | TEMPERATURE: 98.1 F | WEIGHT: 184 LBS | HEART RATE: 81 BPM | DIASTOLIC BLOOD PRESSURE: 76 MMHG | OXYGEN SATURATION: 97 % | SYSTOLIC BLOOD PRESSURE: 122 MMHG | HEIGHT: 64 IN

## 2024-09-19 DIAGNOSIS — Z00.00 ANNUAL PHYSICAL EXAM: Primary | ICD-10-CM

## 2024-09-19 DIAGNOSIS — Z98.84 S/P GASTRIC BYPASS: ICD-10-CM

## 2024-09-19 DIAGNOSIS — F32.0 MILD MAJOR DEPRESSION (HCC): ICD-10-CM

## 2024-09-19 PROCEDURE — 99213 OFFICE O/P EST LOW 20 MIN: CPT | Performed by: FAMILY MEDICINE

## 2024-09-19 PROCEDURE — 99396 PREV VISIT EST AGE 40-64: CPT | Performed by: FAMILY MEDICINE

## 2024-09-19 NOTE — PATIENT INSTRUCTIONS
"Patient Education     Depression in adults   The Basics   Written by the doctors and editors at [facility]   What is depression? -- Depression is a disorder that makes you sad, but it is different from normal sadness. Depression can make it hard for you to work, study, or do everyday tasks.  What causes depression? -- Depression is caused by problems with chemicals in the brain called \"neurotransmitters.\" Some people might be more likely to have depression if it runs in their family. Other things might also play a role, including hormones, certain health problems, medicines, stress, being mistreated as a child, family problems, and problems with friends or at school or work.  How do I know if I am depressed? -- People with depression feel down most of the time for at least 2 weeks. They also have at least 1 of these 2 symptoms:   They no longer enjoy or care about doing the things that they used to like to do.   They feel sad, down, hopeless, or cranky most of the day, almost every day.  People with depression can also have other symptoms. Examples include:   Changes in your appetite or weight. You might eat too little or too much, or gain or lose weight without trying.   Sleeping too much or too little   Feeling tired or like you have no energy   Feeling guilty, helpless, or like you are worth nothing   Trouble with concentration or memory   Acting restless or have trouble staying still, or moving or speaking more slowly than normal   Repeated thoughts of death or killing yourself  If you think that you might be depressed, see your doctor or nurse. Only someone trained in mental health can tell for sure if you are depressed.  How is depression diagnosed? -- Your doctor or nurse will do a physical exam, ask you questions, and might order tests. Depression can have a big impact on your life. Luckily, depression can be treated, and the sooner treatment is started, the better it works.  Get help right away if you are " "thinking of hurting or killing yourself! -- If you ever feel like you might hurt yourself or someone else, help is available:   In the [place], contact the 608 Suicide & Crisis Lifeline:   To speak to someone, call or text 244.   To talk to someone online, go to www.[employer].org/chat.   Call your doctor or nurse, and tell them it is urgent.   Call for an ambulance (in the [place] and [place], call 9-1-1).   Go to the emergency department at the nearest hospital.  What are the treatments for depression? -- Your doctor or nurse will work with you to make a treatment plan. Treatment can include:   Helping you learn more about depression   Counseling (with a psychiatrist, psychologist, nurse, or )   Medicines that relieve depression   Creating a plan to limit access to items that you might use to harm yourself   Other treatments that pass magnetic waves or electricity into the brain  In addition to treatment, getting regular physical activity can also help you feel better.  People with depression that is not too severe can get better by taking medicines or talking with a counselor. People with severe depression usually need medicines to get better, and might also need to see a counselor.  Another treatment involves placing a device against the scalp to pass magnetic waves into the brain. This is called \"transcranial magnetic stimulation\" (\"TMS\"). Doctors might suggest TMS if medicines and counseling have not helped.  Some people with severe depression might need a treatment called \"electroconvulsive therapy\" (\"ECT\"). During ECT, doctors pass an electric current through a person's brain in a safe way.  When will I feel better? -- Most treatment options take a little while to start working.   Many people who take medicines start to feel better within 2 weeks, but it might be 4 to 8 weeks before the medicine has its full effect.   Many people who see a counselor start to feel better within a few weeks, but it might " take 8 to 10 weeks to get the greatest benefit.  If the first treatment you try does not help you, tell your doctor or nurse, but do not give up. Some people need to try different treatments or combinations of treatments before they find an approach that works. Your doctor, nurse, or counselor can work with you to find the treatment that is right for you. They can also help you figure out how to cope while you search for the right treatment or are waiting for your treatment to start working.  How do I decide which treatment to have? -- You and your doctor or nurse will need to work together to choose a treatment for you. Medicines might work a little faster than counseling. But medicines can also cause side effects. Plus, some people do not like the idea of taking medicine.  Seeing a counselor involves talking about your feelings. That is also hard for some people.  What if I take medicine for depression and I want to have a baby? -- Some depression medicines can cause problems for an unborn baby. But having untreated depression during pregnancy can also cause problems. If you want to get pregnant, tell your doctor but do not stop taking your medicines. Together, you can plan the safest way for you to have your baby.  It's also important to talk with your doctor if you want to breastfeed after your baby is born. Breastfeeding has lots of benefits for both mother and baby. Some depression medicines are safer than others to use while breastfeeding. But having untreated depression after giving birth can also cause problems, so do not stop taking your medicines. Your doctor can work with you to plan the safest way for you to feed your baby.  All topics are updated as new evidence becomes available and our peer review process is complete.  This topic retrieved from [employer] on: Mar 06, 2024.  Topic 69601 Version 22.0  Release: 32.2.4 - C32.64  © 2024 UpToDate, Inc. and/or its affiliates. All rights reserved.  figure 1:  "Mood disorders caused by problems in the brain     Mooddisorders, such as depression and bipolar disorder, are caused by problems with\"neurotransmitters.\" These are chemicals in the brain that can affect your emotions.Treatments for mood disorders seem to work by changing the levels of certainneurotransmitters.  Graphic 85598 Version 4.0  Consumer Information Use and Disclaimer   Disclaimer: This generalized information is a limited summary of diagnosis, treatment, and/or medication information. It is not meant to be comprehensive and should be used as a tool to help the user understand and/or assess potential diagnostic and treatment options. It does NOT include all information about conditions, treatments, medications, side effects, or risks that may apply to a specific patient. It is not intended to be medical advice or a substitute for the medical advice, diagnosis, or treatment of a health care provider based on the health care provider's examination and assessment of a patient's specific and unique circumstances. Patients must speak with a health care provider for complete information about their health, medical questions, and treatment options, including any risks or benefits regarding use of medications. This information does not endorse any treatments or medications as safe, effective, or approved for treating a specific patient. UpToDate, Inc. and its affiliates disclaim any warranty or liability relating to this information or the use thereof.The use of this information is governed by the Terms of Use, available at https://www.[employer].com/en/know/clinical-effectiveness-terms. 2024© UpToDate, Inc. and its affiliates and/or licensors. All rights reserved.  Copyright   © 2024 UpToDate, Inc. and/or its affiliates. All rights reserved.    "

## 2024-09-19 NOTE — PROGRESS NOTES
Adult Annual Physical  Name: Jada Ross      : 1980      MRN: [id number]  Encounter Provider: Olimpia Jackson DO  Encounter Date: 2024   Encounter department: [facility] [address]    Assessment & Plan  Annual physical exam         S/P gastric bypass    Orders:    CBC and differential; Future    Comprehensive metabolic panel; Future    Lipid panel; Future    Mild major depression (HCC)  Depression Screening Follow-up Plan: Patient's depression screening was positive with a PHQ-9 score of 8. Patient with underlying depression and was advised to continue current medications as prescribed. Working with psych.          BMI 31.0-31.9,adult    Orders:    Semaglutide-Weight Management (WEGOVY) 0.25 MG/0.5ML; Inject 0.5 mL (0.25 mg total) under the skin once a week for 28 days    Semaglutide-Weight Management (WEGOVY) 0.5 MG/0.5ML; Inject 0.5 mL (0.5 mg total) under the skin once a week for 28 days Do not start before 2024.    CBC and differential; Future    Comprehensive metabolic panel; Future    Lipid panel; Future    Immunizations and preventive care screenings were discussed with patient today. Appropriate education was printed on patient's after visit summary.    Counseling:  Alcohol/drug use: discussed moderation in alcohol intake, the recommendations for healthy alcohol use, and avoidance of illicit drug use.  Dental Health: discussed importance of regular tooth brushing, flossing, and dental visits.  Sexual health: discussed sexually transmitted diseases, partner selection, use of condoms, avoidance of unintended pregnancy, and contraceptive alternatives.  Exercise: the importance of regular exercise/physical activity was discussed. Recommend exercise 3-5 times per week for at least 30 minutes.       Depression Screening and Follow-up Plan: Patient with underlying depression and was advised to continue current medications as prescribed.  "        History of Present Illness     Adult Annual Physical:  Patient presents for annual physical. Peak weight was 288 lbs, now at her lowest (177-184 lbs). States that she has plateaued. States that she is intersted in Wegovy. Denies all GI symptoms. .     Diet and Physical Activity:  - Diet/Nutrition: well balanced diet.  - Exercise: walking, 5-7 times a week on average, moderate cardiovascular exercise and strength training exercises. [facility]    Depression Screening:    - PHQ-9 Score: 8    General Health:  - Sleep: sleeps well.  - Hearing: normal hearing bilateral ears.  - Vision: goes for regular eye exams, most recent eye exam < 1 year ago and wears glasses.  - Dental: regular dental visits and brushes teeth twice daily. flossing.    /GYN Health:  - Follows with GYN: yes.   - Menopause: premenopausal.   - Last menstrual cycle: 9/5/2024.   - History of STDs: no    Review of Systems      Objective     /76   Pulse 81   Temp 98.1 °F (36.7 °C)   Ht 5' 4\" (1.626 m)   Wt 83.5 kg (184 lb)   LMP 09/07/2024 (Exact Date)   SpO2 97%   BMI 31.58 kg/m²     Physical Exam  Vitals reviewed.   Constitutional:       General: She is not in acute distress.     Appearance: Normal appearance.   HENT:      Head: Normocephalic and atraumatic.      Right Ear: External ear normal.      Left Ear: External ear normal.      Nose: Nose normal.      Mouth/Throat:      Mouth: Mucous membranes are moist.   Eyes:      Extraocular Movements: Extraocular movements intact.      Conjunctiva/sclera: Conjunctivae normal.   Cardiovascular:      Rate and Rhythm: Normal rate and regular rhythm.      Heart sounds: Normal heart sounds.   Pulmonary:      Effort: Pulmonary effort is normal.      Breath sounds: Normal breath sounds.   Abdominal:      General: Bowel sounds are normal. There is no distension.      Palpations: Abdomen is soft.      Tenderness: There is no abdominal tenderness.   Musculoskeletal:      Cervical back: " Neck supple.      Right lower leg: No edema.      Left lower leg: No edema.   Lymphadenopathy:      Cervical: No cervical adenopathy.   Skin:     General: Skin is warm.      Capillary Refill: Capillary refill takes less than 2 seconds.      Findings: No rash.   Neurological:      Mental Status: She is alert. Mental status is at baseline.           Olimpia Jackson DO  [facility]  9/19/2024 7:54 AM    Depression Screening Follow-up Plan: Patient's depression screening was positive with a PHQ-2 score of . Their PHQ-9 score was 8. Patient with underlying depression and was advised to continue current medications as prescribed.

## 2024-09-30 ENCOUNTER — APPOINTMENT (OUTPATIENT)
Dept: LAB | Facility: CLINIC | Age: 44
End: 2024-09-30
Payer: COMMERCIAL

## 2024-09-30 DIAGNOSIS — Z98.84 S/P GASTRIC BYPASS: ICD-10-CM

## 2024-09-30 LAB
ALBUMIN SERPL BCG-MCNC: 4.3 G/DL (ref 3.5–5)
ALP SERPL-CCNC: 67 U/L (ref 34–104)
ALT SERPL W P-5'-P-CCNC: 28 U/L (ref 7–52)
ANION GAP SERPL CALCULATED.3IONS-SCNC: 6 MMOL/L (ref 4–13)
AST SERPL W P-5'-P-CCNC: 21 U/L (ref 13–39)
BASOPHILS # BLD AUTO: 0.03 THOUSANDS/ÂΜL (ref 0–0.1)
BASOPHILS NFR BLD AUTO: 1 % (ref 0–1)
BILIRUB SERPL-MCNC: 0.64 MG/DL (ref 0.2–1)
BUN SERPL-MCNC: 11 MG/DL (ref 5–25)
CALCIUM SERPL-MCNC: 8.8 MG/DL (ref 8.4–10.2)
CHLORIDE SERPL-SCNC: 100 MMOL/L (ref 96–108)
CHOLEST SERPL-MCNC: 149 MG/DL
CO2 SERPL-SCNC: 27 MMOL/L (ref 21–32)
CREAT SERPL-MCNC: 0.67 MG/DL (ref 0.6–1.3)
EOSINOPHIL # BLD AUTO: 0.06 THOUSAND/ÂΜL (ref 0–0.61)
EOSINOPHIL NFR BLD AUTO: 1 % (ref 0–6)
ERYTHROCYTE [DISTWIDTH] IN BLOOD BY AUTOMATED COUNT: 13 % (ref 11.6–15.1)
GFR SERPL CREATININE-BSD FRML MDRD: 108 ML/MIN/1.73SQ M
GLUCOSE P FAST SERPL-MCNC: 88 MG/DL (ref 65–99)
HCT VFR BLD AUTO: 39.9 % (ref 34.8–46.1)
HDLC SERPL-MCNC: 56 MG/DL
HGB BLD-MCNC: 12.6 G/DL (ref 11.5–15.4)
IMM GRANULOCYTES # BLD AUTO: 0.01 THOUSAND/UL (ref 0–0.2)
IMM GRANULOCYTES NFR BLD AUTO: 0 % (ref 0–2)
LDLC SERPL CALC-MCNC: 81 MG/DL (ref 0–100)
LYMPHOCYTES # BLD AUTO: 1.7 THOUSANDS/ÂΜL (ref 0.6–4.47)
LYMPHOCYTES NFR BLD AUTO: 33 % (ref 14–44)
MCH RBC QN AUTO: 29.5 PG (ref 26.8–34.3)
MCHC RBC AUTO-ENTMCNC: 31.6 G/DL (ref 31.4–37.4)
MCV RBC AUTO: 93 FL (ref 82–98)
MONOCYTES # BLD AUTO: 0.37 THOUSAND/ÂΜL (ref 0.17–1.22)
MONOCYTES NFR BLD AUTO: 7 % (ref 4–12)
NEUTROPHILS # BLD AUTO: 3 THOUSANDS/ÂΜL (ref 1.85–7.62)
NEUTS SEG NFR BLD AUTO: 58 % (ref 43–75)
NONHDLC SERPL-MCNC: 93 MG/DL
NRBC BLD AUTO-RTO: 0 /100 WBCS
PLATELET # BLD AUTO: 227 THOUSANDS/UL (ref 149–390)
PMV BLD AUTO: 11.9 FL (ref 8.9–12.7)
POTASSIUM SERPL-SCNC: 4.6 MMOL/L (ref 3.5–5.3)
PROT SERPL-MCNC: 6.9 G/DL (ref 6.4–8.4)
RBC # BLD AUTO: 4.27 MILLION/UL (ref 3.81–5.12)
SODIUM SERPL-SCNC: 133 MMOL/L (ref 135–147)
TRIGL SERPL-MCNC: 59 MG/DL
WBC # BLD AUTO: 5.17 THOUSAND/UL (ref 4.31–10.16)

## 2024-09-30 PROCEDURE — 80053 COMPREHEN METABOLIC PANEL: CPT

## 2024-09-30 PROCEDURE — 85025 COMPLETE CBC W/AUTO DIFF WBC: CPT

## 2024-09-30 PROCEDURE — 36415 COLL VENOUS BLD VENIPUNCTURE: CPT

## 2024-09-30 PROCEDURE — 80061 LIPID PANEL: CPT

## 2024-10-17 ENCOUNTER — TELEPHONE (OUTPATIENT)
Age: 44
End: 2024-10-17

## 2024-10-17 ENCOUNTER — PATIENT MESSAGE (OUTPATIENT)
Dept: FAMILY MEDICINE CLINIC | Facility: CLINIC | Age: 44
End: 2024-10-17

## 2024-10-17 NOTE — TELEPHONE ENCOUNTER
Patient can not get the wagovy .5 mg  Wanted to know if she could get .25 gm 2 injection or if there a different brand that could be recommended

## 2024-11-20 ENCOUNTER — OFFICE VISIT (OUTPATIENT)
Dept: FAMILY MEDICINE CLINIC | Facility: CLINIC | Age: 44
End: 2024-11-20
Payer: COMMERCIAL

## 2024-11-20 VITALS
BODY MASS INDEX: 29.12 KG/M2 | DIASTOLIC BLOOD PRESSURE: 82 MMHG | HEIGHT: 64 IN | OXYGEN SATURATION: 98 % | TEMPERATURE: 97 F | WEIGHT: 170.6 LBS | SYSTOLIC BLOOD PRESSURE: 124 MMHG | HEART RATE: 86 BPM

## 2024-11-20 DIAGNOSIS — Z90.3 HISTORY OF SLEEVE GASTRECTOMY: ICD-10-CM

## 2024-11-20 DIAGNOSIS — Z87.09 HISTORY OF NASAL POLYPECTOMY: ICD-10-CM

## 2024-11-20 DIAGNOSIS — J32.0 CHRONIC SINUSITIS OF BOTH MAXILLARY SINUSES: ICD-10-CM

## 2024-11-20 DIAGNOSIS — Z98.890 HISTORY OF NASAL POLYPECTOMY: ICD-10-CM

## 2024-11-20 PROCEDURE — 99214 OFFICE O/P EST MOD 30 MIN: CPT | Performed by: FAMILY MEDICINE

## 2024-11-20 RX ORDER — FLUTICASONE PROPIONATE 50 MCG
1 SPRAY, SUSPENSION (ML) NASAL DAILY
COMMUNITY

## 2024-11-20 NOTE — ASSESSMENT & PLAN NOTE
Orders:    Semaglutide-Weight Management (WEGOVY) 0.25 MG/0.5ML; Inject 0.5 mL (0.25 mg total) under the skin once a week for 28 days    Semaglutide-Weight Management (WEGOVY) 0.5 MG/0.5ML; Inject 0.5 mL (0.5 mg total) under the skin once a week for 28 days Do not start before December 18, 2024.    Semaglutide-Weight Management (WEGOVY) 1 MG/0.5ML; Inject 0.5 mL (1 mg total) under the skin once a week for 28 days Do not start before January 15, 2025.    Semaglutide-Weight Management (WEGOVY) 1.7 MG/0.75ML; Inject 0.75 mL (1.7 mg total) under the skin once a week for 28 days Do not start before February 12, 2025.    Semaglutide-Weight Management (WEGOVY) 2.4 MG/0.75ML; Inject 0.75 mL (2.4 mg total) under the skin once a week for 28 days Do not start before March 12, 2025.

## 2024-11-20 NOTE — PROGRESS NOTES
Name: Jada Ross      : 1980      MRN: [id number]  Encounter Provider: Olimpia Jackson DO  Encounter Date: 2024   Encounter department: [facility] [address]  :  Assessment & Plan  BMI 29.0-29.9,adult    Orders:    Semaglutide-Weight Management (WEGOVY) 0.25 MG/0.5ML; Inject 0.5 mL (0.25 mg total) under the skin once a week for 28 days    Semaglutide-Weight Management (WEGOVY) 0.5 MG/0.5ML; Inject 0.5 mL (0.5 mg total) under the skin once a week for 28 days Do not start before 2024.    Semaglutide-Weight Management (WEGOVY) 1 MG/0.5ML; Inject 0.5 mL (1 mg total) under the skin once a week for 28 days Do not start before January 15, 2025.    Semaglutide-Weight Management (WEGOVY) 1.7 MG/0.75ML; Inject 0.75 mL (1.7 mg total) under the skin once a week for 28 days Do not start before 2025.    Semaglutide-Weight Management (WEGOVY) 2.4 MG/0.75ML; Inject 0.75 mL (2.4 mg total) under the skin once a week for 28 days Do not start before 2025.    History of sleeve gastrectomy    Orders:    Semaglutide-Weight Management (WEGOVY) 0.25 MG/0.5ML; Inject 0.5 mL (0.25 mg total) under the skin once a week for 28 days    Semaglutide-Weight Management (WEGOVY) 0.5 MG/0.5ML; Inject 0.5 mL (0.5 mg total) under the skin once a week for 28 days Do not start before 2024.    Semaglutide-Weight Management (WEGOVY) 1 MG/0.5ML; Inject 0.5 mL (1 mg total) under the skin once a week for 28 days Do not start before January 15, 2025.    Semaglutide-Weight Management (WEGOVY) 1.7 MG/0.75ML; Inject 0.75 mL (1.7 mg total) under the skin once a week for 28 days Do not start before 2025.    Semaglutide-Weight Management (WEGOVY) 2.4 MG/0.75ML; Inject 0.75 mL (2.4 mg total) under the skin once a week for 28 days Do not start before 2025.    Chronic sinusitis of both maxillary sinuses    Orders:    Ambulatory  "Referral to Otolaryngology; Future    History of nasal polypectomy    Orders:    Ambulatory Referral to Otolaryngology; Future       History of Present Illness     HPI    Patient presents to the office for follow up. s/p  kiara shari on 10/31/24. Looking to restart wegovy. Was previousl yon this for 5 weeks.     Has had sinus pressure and pain for about 1 week. States that she is taking Zyrtec, Flonase daily. Notes that this does not seem to help. Reports clear runny nose. Denies fever or chills. Denies cough. Notes some PND and sore throat. Denies N/V. Denies body aches.     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      Review of Systems       Objective   /82 (BP Location: Left arm, Patient Position: Sitting, Cuff Size: Standard)   Pulse 86   Temp (!) 97 °F (36.1 °C) (Temporal)   Ht 5' 4\" (1.626 m)   Wt 77.4 kg (170 lb 9.6 oz)   SpO2 98%   BMI 29.28 kg/m²      Physical Exam  Vitals reviewed.   Constitutional:       General: She is not in acute distress.     Appearance: Normal appearance.   HENT:      Head: Normocephalic and atraumatic.      Right Ear: External ear normal.      Left Ear: External ear normal.      Nose: Nose normal.      Mouth/Throat:      Mouth: Mucous membranes are moist.   Eyes:      Extraocular Movements: Extraocular movements intact.      Conjunctiva/sclera: Conjunctivae normal.      Pupils: Pupils are equal, round, and reactive to light.   Cardiovascular:      Rate and Rhythm: Normal rate and regular rhythm.      Heart sounds: Normal heart sounds.   Pulmonary:      Effort: Pulmonary effort is normal.      Breath sounds: Normal breath sounds.   Abdominal:      General: Bowel sounds are normal. There is no distension.      Palpations: Abdomen is soft.      Tenderness: There is no abdominal tenderness.   Musculoskeletal:      Cervical back: Neck supple.      Right lower leg: No edema.      Left lower leg: No edema.   Lymphadenopathy:      Cervical: No cervical adenopathy.   Skin:    "  General: Skin is warm.      Capillary Refill: Capillary refill takes less than 2 seconds.      Findings: No rash.   Neurological:      Mental Status: She is alert. Mental status is at baseline.           DO Lv Quinn [facility]  11/20/2024 8:51 AM

## 2025-01-06 DIAGNOSIS — Z98.890 HISTORY OF NASAL POLYPECTOMY: ICD-10-CM

## 2025-01-06 DIAGNOSIS — J32.0 CHRONIC SINUSITIS OF BOTH MAXILLARY SINUSES: ICD-10-CM

## 2025-01-06 DIAGNOSIS — Z87.09 HISTORY OF NASAL POLYPECTOMY: ICD-10-CM

## 2025-01-07 RX ORDER — FLUTICASONE PROPIONATE 50 MCG
SPRAY, SUSPENSION (ML) NASAL
Qty: 16 ML | Refills: 1 | Status: SHIPPED | OUTPATIENT
Start: 2025-01-07

## 2025-01-09 ENCOUNTER — TELEPHONE (OUTPATIENT)
Dept: FAMILY MEDICINE CLINIC | Facility: CLINIC | Age: 45
End: 2025-01-09

## 2025-01-09 NOTE — TELEPHONE ENCOUNTER
PA for fluticasone (FLONASE) 50 mcg/act nasal spray SUBMITTED to     via    [x]CMM-KEY: [id number]  []Surescripts-Case ID #   []Availity-Auth ID # NDC #   []Faxed to plan   []Other website   []Phone call Case ID #     [x]PA sent as URGENT    All office notes, labs and other pertaining documents and studies sent. Clinical questions answered. Awaiting determination from insurance company.     Turnaround time for your insurance to make a decision on your Prior Authorization can take 7-21 business days.

## 2025-01-09 NOTE — TELEPHONE ENCOUNTER
Pharmacy comment: Alternative Requested:THE PRESCRIBED MEDICATION IS NOT COVERED BY INSURANCE. PLEASE CONSIDER CHANGING TO ONE OF THE SUGGESTED COVERED ALTERNATIVES.     Reason for call:   [] Refill   [x] Prior Auth  [] Other:     Office:   [] PCP/Provider -   [] Specialty/Provider -     Medication:   fluticasone (FLONASE) 50 mcg/act nasal spray     Dose/Frequency: 1 spray into each nostril daily     Quantity: 16 ml    Pharmacy:   [facility]pharmacy #[id number] - [facility] [address] [address] - [address] [place] [address]       Does the patient have enough for 3 days?   [] Yes   [] No - Send as HP to POD

## 2025-01-10 NOTE — TELEPHONE ENCOUNTER
PA for (FLONASE) 50 mcg/act DENIED    Reason:(Screenshot if applicable)        Message sent to office clinical pool Yes    Denial letter scanned into Media Yes    Appeal started No (Provider will need to decide if appeal is warranted and send clinical documentation to Prior Authorization Team for initiation.)    **Please follow up with your patient regarding denial and next steps**

## 2025-01-13 ENCOUNTER — TELEPHONE (OUTPATIENT)
Age: 45
End: 2025-01-13

## 2025-01-13 NOTE — TELEPHONE ENCOUNTER
PA for (WEGOVY) 1 MG/0.5ML SUBMITTED to     via    []CMM-KEY:   [x]Surescripts-Case ID # [id number]   []Availity-Auth ID # NDC #   []Faxed to plan   []Other website   []Phone call Case ID #     [x]PA sent as URGENT    All office notes, labs and other pertaining documents and studies sent. Clinical questions answered. Awaiting determination from insurance company.     Turnaround time for your insurance to make a decision on your Prior Authorization can take 7-21 business days.

## 2025-01-14 NOTE — TELEPHONE ENCOUNTER
PA for Wegovy 1 mg  APPROVED     Date(s) approved January 13, 2025 to January 13, 2026     Case #[id number]     Patient advised by          []MyChart Message  []Phone call   []LMOM  []L/M to call office as no active Communication consent on file  []Unable to leave detailed message as VM not approved on Communication consent       Pharmacy advised by    [x]Fax  []Phone call    Approval letter scanned into Media Yes

## 2025-01-21 ENCOUNTER — OFFICE VISIT (OUTPATIENT)
Dept: FAMILY MEDICINE CLINIC | Facility: CLINIC | Age: 45
End: 2025-01-21
Payer: COMMERCIAL

## 2025-01-21 VITALS
DIASTOLIC BLOOD PRESSURE: 76 MMHG | BODY MASS INDEX: 28.68 KG/M2 | SYSTOLIC BLOOD PRESSURE: 114 MMHG | HEIGHT: 64 IN | TEMPERATURE: 97.5 F | OXYGEN SATURATION: 98 % | WEIGHT: 168 LBS | HEART RATE: 87 BPM

## 2025-01-21 DIAGNOSIS — M65.4 DE QUERVAIN'S TENOSYNOVITIS, LEFT: Primary | ICD-10-CM

## 2025-01-21 PROCEDURE — 99214 OFFICE O/P EST MOD 30 MIN: CPT | Performed by: FAMILY MEDICINE

## 2025-01-21 NOTE — PROGRESS NOTES
"Name: Jada Ross      : 1980      MRN: [id number]  Encounter Provider: Olimpia Jackson DO  Encounter Date: 2025   Encounter department: [facility] [address] [facility]  :  Assessment & Plan  De Quervain's tenosynovitis, left    Orders:  •  Diclofenac Sodium (VOLTAREN) 1 %; Apply 2 g topically 4 (four) times a day    BMI 28.0-28.9,adult  Continue with Wegovy.               History of Present Illness   HPI    Has been tolerating Wegovy without issues. She just completed 0.5 mg weekly for 4 weeks. Notes that her appetite is decreased, doing high protein dinners. States that she is eating smaller portions.      lbs   lbs    States that her left wrist is painful for the last 1 week. Denies numbness or tingling in the fingers. Denies pain with movement. Denies any known injury. Notes some pain with putting pressure on the wrist. Denies weakness. She is left handed. Denies rash, redness or swelling. Feels like this is worsening of the last 1 week. Tylenol helps a little bit.     Review of Systems    Objective   /76 (BP Location: Left arm, Patient Position: Sitting, Cuff Size: Standard)   Pulse 87   Temp 97.5 °F (36.4 °C) (Temporal)   Ht 5' 4\" (1.626 m)   Wt 76.2 kg (168 lb)   SpO2 98%   BMI 28.84 kg/m²      Physical Exam  Vitals reviewed.   Constitutional:       General: She is not in acute distress.     Appearance: Normal appearance.   HENT:      Head: Normocephalic and atraumatic.      Right Ear: External ear normal.      Left Ear: External ear normal.      Nose: Nose normal.      Mouth/Throat:      Mouth: Mucous membranes are moist.   Eyes:      Extraocular Movements: Extraocular movements intact.      Conjunctiva/sclera: Conjunctivae normal.   Cardiovascular:      Rate and Rhythm: Normal rate and regular rhythm.      Heart sounds: Normal heart sounds.   Pulmonary:      Effort: Pulmonary effort is normal.      Breath sounds: Normal breath " sounds.   Abdominal:      General: Bowel sounds are normal. There is no distension.      Palpations: Abdomen is soft.      Tenderness: There is no abdominal tenderness.   Musculoskeletal:      Right lower leg: No edema.      Left lower leg: No edema.   Skin:     General: Skin is warm.      Capillary Refill: Capillary refill takes less than 2 seconds.      Findings: No rash.   Neurological:      Mental Status: She is alert. Mental status is at baseline.           DO [facility]  1/21/2025 8:33 AM

## 2025-02-25 ENCOUNTER — OFFICE VISIT (OUTPATIENT)
Dept: OBGYN CLINIC | Facility: CLINIC | Age: 45
End: 2025-02-25
Payer: COMMERCIAL

## 2025-02-25 VITALS — WEIGHT: 151.4 LBS | SYSTOLIC BLOOD PRESSURE: 122 MMHG | DIASTOLIC BLOOD PRESSURE: 84 MMHG | BODY MASS INDEX: 25.99 KG/M2

## 2025-02-25 DIAGNOSIS — Z12.31 ENCOUNTER FOR SCREENING MAMMOGRAM FOR MALIGNANT NEOPLASM OF BREAST: ICD-10-CM

## 2025-02-25 DIAGNOSIS — N39.3 STRESS INCONTINENCE IN FEMALE: ICD-10-CM

## 2025-02-25 DIAGNOSIS — Z01.419 ENCOUNTER FOR GYNECOLOGICAL EXAMINATION (GENERAL) (ROUTINE) WITHOUT ABNORMAL FINDINGS: Primary | ICD-10-CM

## 2025-02-25 DIAGNOSIS — R92.30 DENSE BREAST: ICD-10-CM

## 2025-02-25 PROCEDURE — 88175 CYTOPATH C/V AUTO FLUID REDO: CPT

## 2025-02-25 PROCEDURE — S0612 ANNUAL GYNECOLOGICAL EXAMINA: HCPCS

## 2025-02-25 PROCEDURE — 87624 HPV HI-RISK TYP POOLED RSLT: CPT

## 2025-02-25 RX ORDER — BUPROPION HYDROCHLORIDE 300 MG/1
1 TABLET ORAL DAILY
COMMUNITY
Start: 2025-01-06

## 2025-02-25 RX ORDER — TRAZODONE HYDROCHLORIDE 50 MG/1
25-50 TABLET ORAL DAILY
COMMUNITY
Start: 2025-02-10

## 2025-02-25 NOTE — PROGRESS NOTES
Name: Jada Ross      : 1980      MRN: [id number]  Encounter Provider: Ramona Colin PA-C  Encounter Date: 2025   Encounter department: [facility]  :  Assessment & Plan  Encounter for gynecological examination (general) (routine) without abnormal findings  -Pap today  -Mammo slip given today  -Colonoscopy when patient turns 47  -Perineal hygiene reviewed. Weight bearing exercises minium of 150 mins/weekly advised. Kegel exercises recommended. SBE encouraged, A yearly mammogram is recommended for breast cancer screening starting at age 40. ASCCP guidelines reviewed. Condoms encouraged with all sexual activity to prevent STI's. Gardisil vaccines recommended up to age 45. Calcium/ Vit D dietary requirements discussed.   -Advised to call with any issues, all concerns & questions addressed.   -See provided information in your after visit summary     RTO one year for yearly exam or sooner as needed.    Orders:    Liquid-based pap, diagnostic    Stress incontinence in female  -Patient reports stress incontinence when she coughs and sneezes  -Reviewed Kegel exercises   -Referral to pelvic floor PT placed to Zaida Palomares in [place]   Orders:    Ambulatory Referral to Physical Therapy; Future    Encounter for screening mammogram for malignant neoplasm of breast    Orders:    Mammo screening bilateral w 3d and cad; Future    __________________________________________________________________    History of Present Illness   HPI  Jada Ross is a 44 y.o.  presenting for annual exam.     SCREENING  Last Pap: Not on file , Next pap in today  Last Mammo: 2024  BI-RAD 1 Lifetime Tyrer-Cuzick: 17.34% fibroglandular density     GYN    LMP: 25  Periods are regular every 28-30 days, lasting 5 days.  Dysmenorrhea:mild, occurring first 1-2 days of flow.   Cyclic symptoms include headache and irritability    Sexually active: Yes -  single partner - male  Concerns: denies pain, bleeding  Contraception: Vasectomy    Hx Abnormal pap: denies  We reviewed ASCCP guidelines for Pap testing today.  Gardasil: She did not complet the Gardasil series.    Denies vaginal discharge, itching, odor, dyspareunia, pelvic pain and vulvar/vaginal symptoms    OB         Complaints: stress incontinence and urgency   Denies  frequency, hematuria,  / change in stream, difficulty urinating.     BREAST  Complaints: denies   Denies: breast lump, breast tenderness, nipple discharge, skin color change, and skin lesion(s)    Pertinent Family Hx:   Family hx of breast cancer: PGM  Family hx of ovarian cancer: no  Family hx of colon cancer: no     Review of Systems   Constitutional: Negative.    Respiratory: Negative.     Cardiovascular: Negative.    Gastrointestinal: Negative.    Genitourinary: Negative.    Psychiatric/Behavioral: Negative.         Past Medical History:   Diagnosis Date    Anemia     Anxiety     GERD (gastroesophageal reflux disease)     Obesity        Current Outpatient Medications:     Biotin 10 MG TABS, Take 10 mg by mouth daily, Disp: , Rfl:     citalopram (CeleXA) 20 mg tablet, , Disp: , Rfl:     cromolyn (NASALCHROM) 5.2 MG/ACT nasal spray, 1 spray into each nostril daily, Disp: , Rfl:     Diclofenac Sodium (VOLTAREN) 1 %, Apply 2 g topically 4 (four) times a day, Disp: 240 g, Rfl: 0    fluticasone (FLONASE) 50 mcg/act nasal spray, 1 spray into each nostril daily, Disp: 16 mL, Rfl: 1    multivitamin (THERAGRAN) TABS, Take 1 tablet by mouth daily, Disp: , Rfl:     Semaglutide-Weight Management (WEGOVY) 1.7 MG/0.75ML, Inject 0.75 mL (1.7 mg total) under the skin once a week for 28 days Do not start before 2025. (Patient not taking: Reported on 2025 Do not start before 2025.), Disp: 3 mL, Rfl: 0    [START ON 3/12/2025] Semaglutide-Weight Management (WEGOVY) 2.4 MG/0.75ML, Inject 0.75 mL (2.4 mg total) under the  skin once a week for 28 days Do not start before 2025. (Patient not taking: Reported on 2025 Do not start before 2025.), Disp: 3 mL, Rfl: 0    senna-docusate sodium (SENOKOT S) 8.6-50 mg per tablet, Take 1 tablet by mouth if needed, Disp: , Rfl:      Social History     Socioeconomic History    Marital status: /Civil Union     Spouse name: Not on file    Number of children: Not on file    Years of education: Not on file    Highest education level: Not on file   Occupational History    Not on file   Tobacco Use    Smoking status: Former     Current packs/day: 0.00     Average packs/day: 1 pack/day for 5.0 years (5.0 ttl pk-yrs)     Types: Cigarettes     Quit date: 10/10/2010     Years since quittin.3     Passive exposure: Never    Smokeless tobacco: Never   Vaping Use    Vaping status: Never Used   Substance and Sexual Activity    Alcohol use: Never    Drug use: Never    Sexual activity: Yes     Partners: Male     Birth control/protection: Male Sterilization   Other Topics Concern    Not on file   Social History Narrative    Not on file     Social Drivers of Health     Financial Resource Strain: Not on file   Food Insecurity: Not on file   Transportation Needs: Not on file   Physical Activity: Sufficiently Active (2021)    Received from [facility]    Exercise Vital Sign     Days of Exercise per Week: 5 days     Minutes of Exercise per Session: 30 min   Stress: No Stress Concern Present (2021)    Received from [facility], [facility]    [employer] [place] [employer] - Occupational Stress Questionnaire     Feeling of Stress : Not at all   Social Connections: Moderately Isolated (2021)    Received from [facility], [facility]    Social Connection and Isolation Panel [NHANES]     Frequency of Communication with Friends and Family: More than three times a week     Frequency of  Social Gatherings with Friends and Family: Once a week     Attends Yazidi Services: Never     Active Member of Clubs or Organizations: No     Attends Club or Organization Meetings: Never     Marital Status:    Intimate Partner Violence: Not At Risk (2021)    [facility], [facility]    Humiliation, Afraid, Rape, and Kick questionnaire     Fear of Current or Ex-Partner: No     Emotionally Abused: No     Physically Abused: No     Sexually Abused: No   Housing Stability: Not on file       No Known Allergies    Past Surgical History:   Procedure Laterality Date    BRAIN SURGERY  08/10/2022    Venous sinus stenosis     SECTION  2012, 2018    GASTRIC BYPASS  2016    HERNIA REPAIR      REVISION GASTRIC RESTRICTIVE PROCEDURE FOR MORBID OBESITY  2023       Objective   There were no vitals taken for this visit.     Physical Exam  Constitutional:       General: She is not in acute distress.     Appearance: Normal appearance. She is well-developed and well-groomed. She is not ill-appearing.   Genitourinary:      Bladder, rectum and urethral meatus normal.      No lesions in the vagina.      Right Labia: No rash, tenderness, lesions or skin changes.     Left Labia: No tenderness, lesions, skin changes or rash.     No vaginal discharge, erythema, tenderness or bleeding.      No vaginal prolapse present.     No vaginal atrophy present.       Right Adnexa: not tender, not full and no mass present.     Left Adnexa: not tender, not full and no mass present.     No cervical motion tenderness, discharge, friability, lesion or polyp.      Uterus is not enlarged, fixed, tender or irregular.      No uterine mass detected.     Uterus is anteverted.      No urethral tenderness or mass present.      Bladder is not tender.    Breasts:     Breasts are symmetrical.      Right: Normal. Present. No swelling, bleeding, inverted nipple, mass, nipple discharge, skin  change, tenderness or breast implant.      Left: Normal. Present. No swelling, bleeding, inverted nipple, mass, nipple discharge, skin change, tenderness or breast implant.   HENT:      Head: Normocephalic and atraumatic.   Neck:      Thyroid: No thyroid mass, thyromegaly or thyroid tenderness.   Pulmonary:      Effort: Pulmonary effort is normal.   Abdominal:      General: Abdomen is flat.   Lymphadenopathy:      Upper Body:      Right upper body: No supraclavicular or axillary adenopathy.      Left upper body: No supraclavicular or axillary adenopathy.   Neurological:      Mental Status: She is alert.   Psychiatric:         Mood and Affect: Mood normal.         Behavior: Behavior normal. Behavior is cooperative.         Thought Content: Thought content normal.         Judgment: Judgment normal.

## 2025-02-25 NOTE — PROGRESS NOTES
Name: Jada Ross      : 1980      MRN: [id number]  Encounter Provider: Ramona Colin PA-C  Encounter Date: 2025   Encounter department: [facility]  :  Assessment & Plan  Encounter for gynecological examination (general) (routine) without abnormal findings    Orders:    Liquid-based pap, diagnostic    Stress incontinence in female    Orders:    Ambulatory Referral to Physical Therapy; Future    Dense breast         Encounter for screening mammogram for malignant neoplasm of breast    Orders:    Mammo screening bilateral w 3d and cad; Future        History of Present Illness {?Quick Links Encounters * My Last Note * Last Note in Specialty * Snapshot * Since Last Visit * History :37683}  Gynecologic Exam      Jada Ross is a 44 y.o. female who presents ***  {History obtained from(Optional):96708}    Review of Systems  {Select to insert medical history sections (Optional):56844}     Objective {?Quick Links Trend Vitals * Enter New Vitals * Results Review * Timeline (Adult) * Labs * Imaging * Cardiology * Procedures * Lung Cancer Screening * Surgical eConsent :47531}  /84 (BP Location: Left arm, Patient Position: Sitting, Cuff Size: Standard)   Wt 68.7 kg (151 lb 6.4 oz)   BMI 25.99 kg/m²      Physical Exam    {Administrative / Billing Section (Optional):24241}

## 2025-02-26 LAB
HPV HR 12 DNA CVX QL NAA+PROBE: NEGATIVE
HPV16 DNA CVX QL NAA+PROBE: NEGATIVE
HPV18 DNA CVX QL NAA+PROBE: NEGATIVE

## 2025-03-03 ENCOUNTER — RESULTS FOLLOW-UP (OUTPATIENT)
Dept: OBGYN CLINIC | Facility: CLINIC | Age: 45
End: 2025-03-03

## 2025-03-03 LAB
LAB AP GYN PRIMARY INTERPRETATION: NORMAL
Lab: NORMAL

## 2025-03-10 ENCOUNTER — OFFICE VISIT (OUTPATIENT)
Dept: FAMILY MEDICINE CLINIC | Facility: CLINIC | Age: 45
End: 2025-03-10
Payer: COMMERCIAL

## 2025-03-10 VITALS
BODY MASS INDEX: 25.44 KG/M2 | HEART RATE: 80 BPM | DIASTOLIC BLOOD PRESSURE: 78 MMHG | TEMPERATURE: 97 F | HEIGHT: 64 IN | OXYGEN SATURATION: 98 % | SYSTOLIC BLOOD PRESSURE: 124 MMHG | WEIGHT: 149 LBS

## 2025-03-10 DIAGNOSIS — Z90.3 HISTORY OF SLEEVE GASTRECTOMY: ICD-10-CM

## 2025-03-10 PROCEDURE — 99214 OFFICE O/P EST MOD 30 MIN: CPT | Performed by: FAMILY MEDICINE

## 2025-03-10 NOTE — ASSESSMENT & PLAN NOTE
Orders:  •  Semaglutide-Weight Management (WEGOVY) 2.4 MG/0.75ML; Inject 0.75 mL (2.4 mg total) under the skin once a week for 28 days Do not start before March 12, 2025.

## 2025-03-10 NOTE — PROGRESS NOTES
"Name: Jada Ross      : 1980      MRN: [id number]  Encounter Provider: Olimpia Jackson DO  Encounter Date: 3/10/2025   Encounter department: [facility] [address]  :  Assessment & Plan  BMI 25.0-25.9,adult    Orders:  •  Semaglutide-Weight Management (WEGOVY) 2.4 MG/0.75ML; Inject 0.75 mL (2.4 mg total) under the skin once a week for 28 days Do not start before 2025.    History of sleeve gastrectomy    Orders:  •  Semaglutide-Weight Management (WEGOVY) 2.4 MG/0.75ML; Inject 0.75 mL (2.4 mg total) under the skin once a week for 28 days Do not start before 2025.         History of Present Illness   HPI    Patient presents to the office for weight follow up. She is on Wegovy 1.7 mg weekly and states taht she is doing well. She is tolerating the medication without significant side effects. She is making healthier choices, meal prepping. States that she is eating smaller portions as well.   Cardio and strength training 5 days per week.           Review of Systems    Objective   /78 (BP Location: Left arm, Patient Position: Sitting, Cuff Size: Adult)   Pulse 80   Temp (!) 97 °F (36.1 °C) (Tympanic)   Ht 5' 4\" (1.626 m)   Wt 67.6 kg (149 lb)   SpO2 98%   BMI 25.58 kg/m²      Physical Exam  Vitals reviewed.   Constitutional:       General: She is not in acute distress.     Appearance: Normal appearance.   HENT:      Head: Normocephalic and atraumatic.      Right Ear: External ear normal.      Left Ear: External ear normal.      Nose: Nose normal.      Mouth/Throat:      Mouth: Mucous membranes are moist.   Eyes:      Extraocular Movements: Extraocular movements intact.      Conjunctiva/sclera: Conjunctivae normal.      Pupils: Pupils are equal, round, and reactive to light.   Cardiovascular:      Rate and Rhythm: Normal rate and regular rhythm.      Heart sounds: Normal heart sounds.   Pulmonary:      Effort: Pulmonary " effort is normal.      Breath sounds: Normal breath sounds.   Abdominal:      General: Bowel sounds are normal. There is no distension.      Palpations: Abdomen is soft.      Tenderness: There is no abdominal tenderness.   Musculoskeletal:      Cervical back: Neck supple.      Right lower leg: No edema.      Left lower leg: No edema.   Lymphadenopathy:      Cervical: No cervical adenopathy.   Skin:     General: Skin is warm.      Capillary Refill: Capillary refill takes less than 2 seconds.      Findings: No rash.   Neurological:      Mental Status: She is alert. Mental status is at baseline.         DO [facility]  3/10/2025 9:08 AM

## 2025-03-31 ENCOUNTER — OFFICE VISIT (OUTPATIENT)
Dept: FAMILY MEDICINE CLINIC | Facility: CLINIC | Age: 45
End: 2025-03-31
Payer: COMMERCIAL

## 2025-03-31 VITALS
WEIGHT: 146 LBS | BODY MASS INDEX: 24.92 KG/M2 | DIASTOLIC BLOOD PRESSURE: 64 MMHG | HEART RATE: 80 BPM | HEIGHT: 64 IN | OXYGEN SATURATION: 99 % | SYSTOLIC BLOOD PRESSURE: 108 MMHG

## 2025-03-31 DIAGNOSIS — R10.13 EPIGASTRIC PAIN: Primary | ICD-10-CM

## 2025-03-31 PROCEDURE — 99214 OFFICE O/P EST MOD 30 MIN: CPT

## 2025-03-31 NOTE — PROGRESS NOTES
Name: Jada Ross      : 1980      MRN: [id number]  Encounter Provider: Stefanie Dasilva PA-C  Encounter Date: 3/31/2025   Encounter department: [facility] [address]  :  Assessment & Plan  Epigastric pain  -Abdominal pain, bloated x 5 days   -She increased Wegovy dose from 1.7 to 2.4 on 3/21/2025 and symptoms began 3/26/2025. She has not taken Wegovy since. She has been on Wegovy since 2024 and has not experienced side effects. S/p lap shari 10/2024  -She has tried GasX, passing gas and burping does not improve sx and notes it is worsened with eating/drinking  -Notes stools have been looser. Denies vomiting, diarrhea, fever, chills, hx of pancreatitis, chest pain, SOB  -Discussed side effects of Wegovy with recent dose increase. Ordering CBC, CMP, Lipase, and RUQ US to further evaluate   -Discussed if unremarkable work up, can consider reducing dose of Wegovy     Orders:    CBC and differential; Future    Comprehensive metabolic panel; Future    Lipase; Future    US right upper quadrant; Future           History of Present Illness     HPI    Jada presents to the office for evaluation of stomach pain. She recently increased dose of Wegovy from 1.7 to 2.4 on 3/21/2025 and notes abdominal pain began 3/26/2025, she did not take following dose. The pain is in epigastric region, feels crampy, and bloated.     She has been trying GasX, and passing gas and burping does not improve much. Pain worse after eating/drinking.     Denies vomiting, notes lose stools. Denies fever, chills. Denies hx pancreatitis and Fhx of pancreatitis.       Review of Systems   Constitutional:  Negative for activity change, appetite change, fatigue and fever.   HENT:  Negative for congestion, ear pain, rhinorrhea and sore throat.    Eyes:  Negative for pain.   Respiratory:  Negative for cough and shortness of breath.    Cardiovascular:  Negative for chest pain and leg swelling.  "  Gastrointestinal:  Positive for abdominal pain. Negative for abdominal distention, constipation, diarrhea, nausea and vomiting.   Genitourinary:  Negative for dysuria, frequency and urgency.   Musculoskeletal:  Negative for gait problem.   Skin:  Negative for rash.   Neurological:  Negative for dizziness, light-headedness and headaches.       Objective   /64   Pulse 80   Ht 5' 4\" (1.626 m)   Wt 66.2 kg (146 lb)   SpO2 99%   BMI 25.06 kg/m²      Physical Exam  Vitals reviewed.   Constitutional:       General: She is not in acute distress.     Appearance: Normal appearance.   HENT:      Head: Normocephalic and atraumatic.      Right Ear: External ear normal.      Left Ear: External ear normal.      Nose: Nose normal.      Mouth/Throat:      Mouth: Mucous membranes are moist.   Eyes:      Extraocular Movements: Extraocular movements intact.      Conjunctiva/sclera: Conjunctivae normal.   Cardiovascular:      Rate and Rhythm: Normal rate and regular rhythm.      Heart sounds: Normal heart sounds.   Pulmonary:      Effort: Pulmonary effort is normal.      Breath sounds: Normal breath sounds. No wheezing, rhonchi or rales.   Abdominal:      General: Bowel sounds are normal. There is no distension.      Palpations: Abdomen is soft.      Tenderness: There is generalized abdominal tenderness and tenderness in the epigastric area. There is no right CVA tenderness, left CVA tenderness, guarding or rebound. Negative signs include Rovsing's sign, McBurney's sign and psoas sign.   Musculoskeletal:      Cervical back: Neck supple.      Right lower leg: No edema.      Left lower leg: No edema.   Lymphadenopathy:      Cervical: No cervical adenopathy.   Skin:     General: Skin is warm.      Capillary Refill: Capillary refill takes less than 2 seconds.      Findings: No rash.   Neurological:      Mental Status: She is alert. Mental status is at baseline.           Stefanie Dasilva PA-C  [facility]  3/31/2025 9:13 " AM

## 2025-04-01 ENCOUNTER — HOSPITAL ENCOUNTER (OUTPATIENT)
Dept: ULTRASOUND IMAGING | Facility: HOSPITAL | Age: 45
Discharge: HOME | End: 2025-04-01
Payer: COMMERCIAL

## 2025-04-01 ENCOUNTER — APPOINTMENT (OUTPATIENT)
Dept: LAB | Facility: CLINIC | Age: 45
End: 2025-04-01
Payer: COMMERCIAL

## 2025-04-01 DIAGNOSIS — R10.13 EPIGASTRIC PAIN: ICD-10-CM

## 2025-04-01 DIAGNOSIS — Q45.3 PANCREATIC ABNORMALITY: ICD-10-CM

## 2025-04-01 LAB
ALBUMIN SERPL BCG-MCNC: 4.2 G/DL (ref 3.5–5)
ALP SERPL-CCNC: 68 U/L (ref 34–104)
ALT SERPL W P-5'-P-CCNC: 19 U/L (ref 7–52)
ANION GAP SERPL CALCULATED.3IONS-SCNC: 7 MMOL/L (ref 4–13)
AST SERPL W P-5'-P-CCNC: 13 U/L (ref 13–39)
BASOPHILS # BLD AUTO: 0.03 THOUSANDS/ÂΜL (ref 0–0.1)
BASOPHILS NFR BLD AUTO: 1 % (ref 0–1)
BILIRUB SERPL-MCNC: 0.64 MG/DL (ref 0.2–1)
BUN SERPL-MCNC: 14 MG/DL (ref 5–25)
CALCIUM SERPL-MCNC: 9 MG/DL (ref 8.4–10.2)
CHLORIDE SERPL-SCNC: 104 MMOL/L (ref 96–108)
CO2 SERPL-SCNC: 29 MMOL/L (ref 21–32)
CREAT SERPL-MCNC: 0.62 MG/DL (ref 0.6–1.3)
EOSINOPHIL # BLD AUTO: 0.05 THOUSAND/ÂΜL (ref 0–0.61)
EOSINOPHIL NFR BLD AUTO: 1 % (ref 0–6)
ERYTHROCYTE [DISTWIDTH] IN BLOOD BY AUTOMATED COUNT: 12.9 % (ref 11.6–15.1)
GFR SERPL CREATININE-BSD FRML MDRD: 111 ML/MIN/1.73SQ M
GLUCOSE P FAST SERPL-MCNC: 93 MG/DL (ref 65–99)
HCT VFR BLD AUTO: 37.5 % (ref 34.8–46.1)
HGB BLD-MCNC: 12.3 G/DL (ref 11.5–15.4)
IMM GRANULOCYTES # BLD AUTO: 0.02 THOUSAND/UL (ref 0–0.2)
IMM GRANULOCYTES NFR BLD AUTO: 0 % (ref 0–2)
LIPASE SERPL-CCNC: 82 U/L (ref 11–82)
LYMPHOCYTES # BLD AUTO: 1.52 THOUSANDS/ÂΜL (ref 0.6–4.47)
LYMPHOCYTES NFR BLD AUTO: 28 % (ref 14–44)
MCH RBC QN AUTO: 29.6 PG (ref 26.8–34.3)
MCHC RBC AUTO-ENTMCNC: 32.8 G/DL (ref 31.4–37.4)
MCV RBC AUTO: 90 FL (ref 82–98)
MONOCYTES # BLD AUTO: 0.31 THOUSAND/ÂΜL (ref 0.17–1.22)
MONOCYTES NFR BLD AUTO: 6 % (ref 4–12)
NEUTROPHILS # BLD AUTO: 3.44 THOUSANDS/ÂΜL (ref 1.85–7.62)
NEUTS SEG NFR BLD AUTO: 64 % (ref 43–75)
NRBC BLD AUTO-RTO: 0 /100 WBCS
PLATELET # BLD AUTO: 232 THOUSANDS/UL (ref 149–390)
PMV BLD AUTO: 11.4 FL (ref 8.9–12.7)
POTASSIUM SERPL-SCNC: 4 MMOL/L (ref 3.5–5.3)
PROT SERPL-MCNC: 6.3 G/DL (ref 6.4–8.4)
RBC # BLD AUTO: 4.15 MILLION/UL (ref 3.81–5.12)
SODIUM SERPL-SCNC: 140 MMOL/L (ref 135–147)
WBC # BLD AUTO: 5.37 THOUSAND/UL (ref 4.31–10.16)

## 2025-04-01 PROCEDURE — 76705 ECHO EXAM OF ABDOMEN: CPT

## 2025-04-01 PROCEDURE — 36415 COLL VENOUS BLD VENIPUNCTURE: CPT

## 2025-04-01 PROCEDURE — 83690 ASSAY OF LIPASE: CPT

## 2025-04-01 PROCEDURE — 80053 COMPREHEN METABOLIC PANEL: CPT

## 2025-04-01 PROCEDURE — 85025 COMPLETE CBC W/AUTO DIFF WBC: CPT

## 2025-04-02 ENCOUNTER — RESULTS FOLLOW-UP (OUTPATIENT)
Dept: FAMILY MEDICINE CLINIC | Facility: CLINIC | Age: 45
End: 2025-04-02

## 2025-04-04 ENCOUNTER — RESULTS FOLLOW-UP (OUTPATIENT)
Dept: FAMILY MEDICINE CLINIC | Facility: CLINIC | Age: 45
End: 2025-04-04

## 2025-04-04 DIAGNOSIS — Z90.3 HISTORY OF SLEEVE GASTRECTOMY: ICD-10-CM

## 2025-04-04 DIAGNOSIS — R14.0 ABDOMINAL BLOATING: Primary | ICD-10-CM

## 2025-04-04 RX ORDER — SIMETHICONE 125 MG
125 CAPSULE ORAL 4 TIMES DAILY PRN
Qty: 28 CAPSULE | Refills: 0 | Status: SHIPPED | OUTPATIENT
Start: 2025-04-04

## 2025-04-07 RX ORDER — SEMAGLUTIDE 1.7 MG/.75ML
INJECTION, SOLUTION SUBCUTANEOUS
Qty: 3 ML | Refills: 0 | Status: SHIPPED | OUTPATIENT
Start: 2025-04-07 | End: 2025-05-05

## 2025-04-07 NOTE — TELEPHONE ENCOUNTER
PT did not tolerate Wegovy 2.4 mg due to GI side effects. Sending Wegovy 1.7 mg, which was previously tolerated.    Stefanie Dasilva PA-C  [facility]  4/7/2025 8:23 AM

## 2025-04-30 ENCOUNTER — TELEPHONE (OUTPATIENT)
Dept: PLASTIC SURGERY | Facility: CLINIC | Age: 45
End: 2025-04-30

## 2025-05-05 DIAGNOSIS — Z90.3 HISTORY OF SLEEVE GASTRECTOMY: ICD-10-CM

## 2025-05-08 RX ORDER — SEMAGLUTIDE 1.7 MG/.75ML
INJECTION, SOLUTION SUBCUTANEOUS
Qty: 3 ML | Refills: 0 | Status: SHIPPED | OUTPATIENT
Start: 2025-05-08 | End: 2025-06-02

## 2025-06-03 ENCOUNTER — OFFICE VISIT (OUTPATIENT)
Dept: DERMATOLOGY | Facility: CLINIC | Age: 45
End: 2025-06-03
Payer: COMMERCIAL

## 2025-06-03 DIAGNOSIS — L85.3 XEROSIS OF SKIN: ICD-10-CM

## 2025-06-03 DIAGNOSIS — D18.01 CHERRY ANGIOMA: ICD-10-CM

## 2025-06-03 DIAGNOSIS — L70.0 ACNE VULGARIS: Primary | ICD-10-CM

## 2025-06-03 DIAGNOSIS — D22.9 MELANOCYTIC NEVUS, UNSPECIFIED LOCATION: ICD-10-CM

## 2025-06-03 DIAGNOSIS — L81.4 LENTIGO: ICD-10-CM

## 2025-06-03 PROCEDURE — 99204 OFFICE O/P NEW MOD 45 MIN: CPT | Performed by: DERMATOLOGY

## 2025-06-03 RX ORDER — DOXYCYCLINE 50 MG/1
50 TABLET ORAL 2 TIMES DAILY
Qty: 60 TABLET | Refills: 0 | Status: SHIPPED | OUTPATIENT
Start: 2025-06-03 | End: 2025-07-03

## 2025-06-03 NOTE — PROGRESS NOTES
"[facility] Dermatology Clinic Note     Patient Name: Jada Ross  Encounter Date: 06/03/2025       Have you been cared for by a [facility] Dermatologist in the last 3 years and, if so, which description applies to you? NO. I am considered a \"new\" patient and must complete all patient intake questions. I am of child-bearing potential.     REVIEW OF SYSTEMS:  Have you recently had or currently have any of the following? Recent fever or chills? No  Any non-healing wound? No  Are you pregnant or planning to become pregnant? No  Are you currently or planning to be nursing or breast feeding? No   PAST MEDICAL HISTORY:  Have you personally ever had or currently have any of the following?  If \"YES,\" then please provide more detail. Skin cancer (such as Melanoma, Basal Cell Carcinoma, Squamous Cell Carcinoma?  No  Tuberculosis, HIV/AIDS, Hepatitis B or C: No  Radiation Treatment No   HISTORY OF IMMUNOSUPPRESSION:   Do you have a history of any of the following:  Systemic Immunosuppression such as Diabetes, Biologic or Immunotherapy, Chemotherapy, Organ Transplantation, Bone Marrow Transplantation or Prednsione?  No    Answering \"YES\" requires the addition of the dotphrase \"IMMUNOSUPPRESSED\" as the first diagnosis of the patient's visit.   FAMILY HISTORY:  Any \"first degree relatives\" (parent, brother, sister, or child) with the following?    Skin Cancer, Pancreatic or Other Cancer? YES, aunt - melanoma, cousins- melanoma    PATIENT EXPERIENCE:    Do you want the Dermatologist to perform a COMPLETE skin exam today including a clinical examination under the \"bra and underwear\" areas?  Yes not under bra or underwear   If necessary, do we have your permission to call and leave a detailed message on your Preferred Phone number that includes your specific medical information?  Yes      Allergies[1] Current Medications[2]              Whom besides the patient is providing clinical information about today's encounter? " [id number] "  NO ADDITIONAL HISTORIAN (patient alone provided history)    Physical Exam and Assessment/Plan by Diagnosis:      ACNE   Physical Exam:  Anatomic Location Affected:  right chin  Morphological Description:  one pink papule   Pertinent Positives:  Pertinent Negatives:    Additional History of Present Condition:  present for several months.     Assessment and Plan:  Based on a thorough discussion of this condition and the management approach to it (including a comprehensive discussion of the known risks, side effects and potential benefits of treatment), the patient (family) agrees to implement the following specific plan:  Dicussed ILK injection  Start doxycyline 50 mg twice daily for 30 days   Start using over the counter Differin Gel (adapalene 0.1% gel). Spread one pea-sized amount of medication over entire face about one hour before bedtime         MELANOCYTIC NEVI (\"Moles\")    Physical Exam:  Anatomic Location Affected:   Mostly on sun-exposed areas of the trunk and extremities  Morphological Description:  Scattered, 1-4mm round to ovoid, symmetrical-appearing, even bordered, skin colored to dark brown macules/papules, mostly in sun-exposed areas  Pertinent Positives:  Pertinent Negatives:    Additional History of Present Condition:      Assessment and Plan:  Based on a thorough discussion of this condition and the management approach to it (including a comprehensive discussion of the known risks, side effects and potential benefits of treatment), the patient (family) agrees to implement the following specific plan:  When outside we recommend using a wide brim hat, sunglasses, long sleeve and pants, sunscreen with SPF 30+ with reapplication every 2 hours, or SPF specific clothing   Benign, reassured  Annual skin check     Melanocytic Nevi  Melanocytic nevi (\"moles\") are tan or brown, raised or flat areas of the skin which have an increased number of melanocytes. Melanocytes are the cells in our body which make " "pigment and account for skin color.    Some moles are present at birth (I.e., \"congenital nevi\"), while others come up later in life (i.e., \"acquired nevi\").  The sun can stimulate the body to make more moles.  Sunburns are not the only thing that triggers more moles.  Chronic sun exposure can do it too.     Clinically distinguishing a healthy mole from melanoma may be difficult, even for experienced dermatologists. The \"ABCDE's\" of moles have been suggested as a means of helping to alert a person to a suspicious mole and the possible increased risk of melanoma.  The suggestions for raising alert are as follows:    Asymmetry: Healthy moles tend to be symmetric, while melanomas are often asymmetric.  Asymmetry means if you draw a line through the mole, the two halves do not match in color, size, shape, or surface texture. Asymmetry can be a result of rapid enlargement of a mole, the development of a raised area on a previously flat lesion, scaling, ulceration, bleeding or scabbing within the mole.  Any mole that starts to demonstrate \"asymmetry\" should be examined promptly by a board certified dermatologist.     Border: Healthy moles tend to have discrete, even borders.  The border of a melanoma often blends into the normal skin and does not sharply delineate the mole from normal skin.  Any mole that starts to demonstrate \"uneven borders\" should be examined promptly by a board certified dermatologist.     Color: Healthy moles tend to be one color throughout.  Melanomas tend to be made up of different colors ranging from dark black, blue, white, or red.  Any mole that demonstrates a color change should be examined promptly by a board certified dermatologist.     Diameter: Healthy moles tend to be smaller than 0.6 cm in size; an exception are \"congenital nevi\" that can be larger.  Melanomas tend to grow and can often be greater than 0.6 cm (1/4 of an inch, or the size of a pencil eraser). This is only a guideline, and " "many normal moles may be larger than 0.6 cm without being unhealthy.  Any mole that starts to change in size (small to bigger or bigger to smaller) should be examined promptly by a board certified dermatologist.     Evolving: Healthy moles tend to \"stay the same.\"  Melanomas may often show signs of change or evolution such as a change in size, shape, color, or elevation.  Any mole that starts to itch, bleed, crust, burn, hurt, or ulcerate or demonstrate a change or evolution should be examined promptly by a board certified dermatologist.        LENTIGO    Physical Exam:  Anatomic Location Affected:  trunk, arms  Morphological Description:  Light brown macules  Pertinent Positives:  Pertinent Negatives:    Additional History of Present Condition:      Assessment and Plan:  Based on a thorough discussion of this condition and the management approach to it (including a comprehensive discussion of the known risks, side effects and potential benefits of treatment), the patient (family) agrees to implement the following specific plan:  When outside we recommend using a wide brim hat, sunglasses, long sleeve and pants, sunscreen with SPF 30+ with reapplication every 2 hours, or SPF specific clothing       What is a lentigo?  A lentigo is a pigmented flat or slightly raised lesion with a clearly defined edge. Unlike an ephelis (freckle), it does not fade in the winter months. There are several kinds of lentigo.  The name lentigo originally referred to its appearance resembling a small lentil. The plural of lentigo is lentigines, although “lentigos” is also in common use.    Who gets lentigines?  Lentigines can affect males and females of all ages and races. Solar lentigines are especially prevalent in fair skinned adults. Lentigines associated with syndromes are present at birth or arise during childhood.    What causes lentigines?  Common forms of lentigo are due to exposure to ultraviolet radiation:  Sun damage including " "sunburn   Indoor tanning   Phototherapy, especially photochemotherapy (PUVA)    Ionizing radiation, eg radiation therapy, can also cause lentigines.  Several familial syndromes associated with widespread lentigines originate from mutations in Alfonso-MAP kinase, mTOR signaling and PTEN pathways.    What is the treatment for lentigines?  Most lentigines are left alone. Attempts to lighten them may not be successful. The following approaches are used:  SPF 50+ broad-spectrum sunscreen   Hydroquinone bleaching cream   Alpha hydroxy acids   Vitamin C   Retinoids   Azelaic acid   Chemical peels  Individual lesions can be permanently removed using:  Cryotherapy   Intense pulsed light   Pigment lasers    How can lentigines be prevented?  Lentigines associated with exposure ultraviolet radiation can be prevented by very careful sun protection. Clothing is more successful at preventing new lentigines than are sunscreens.    What is the outlook for lentigines?  Lentigines usually persist. They may increase in number with age and sun exposure. Some in sun-protected sites may fade and disappear.    IRWIN ANGIOMAS    Physical Exam:  Anatomic Location Affected:  trunk  Morphological Description:  Scattered cherry red, 1-4 mm papules.  Pertinent Positives:  Pertinent Negatives:    Additional History of Present Condition:      Assessment and Plan:  Based on a thorough discussion of this condition and the management approach to it (including a comprehensive discussion of the known risks, side effects and potential benefits of treatment), the patient (family) agrees to implement the following specific plan:  Monitor for changes  Benign, reassured      Assessment and Plan:    Cherry angioma, also known as [place] spots, are benign vascular skin lesions. A \"cherry angioma\" is a firm red, blue or purple papule, 0.1-1 cm in diameter. When thrombosed, they can appear black in colour until evaluated with a dermatoscope when the red " "or purple colour is more easily seen. Cherry angioma may develop on any part of the body but most often appear on the scalp, face, lips and trunk.  An angioma is due to proliferating endothelial cells; these are the cells that line the inside of a blood vessel.    Angiomas can arise in early life or later in life; the most common type of angioma is a cherry angioma.  Cherry angiomas are very common in males and females of any age or race. They are more noticeable in white skin than in skin of colour. They markedly increase in number from about the age of 40. There may be a family history of similar lesions. Eruptive cherry angiomas have been rarely reported to be associated with internal malignancy. The cause of angiomas is unknown. Genetic analysis of cherry angiomas has shown that they frequently carry specific somatic missense mutations in the GNAQ and GNA11 (Q209H) genes, which are involved in other vascular and melanocytic proliferations.        XEROSIS (\"DRY SKIN\")    Physical Exam:  Anatomic Location Affected:  diffuse  Morphological Description:  xerosis  Pertinent Positives:  Pertinent Negatives:    Additional History of Present Condition:      Assessment and Plan:  Based on a thorough discussion of this condition and the management approach to it (including a comprehensive discussion of the known risks, side effects and potential benefits of treatment), the patient (family) agrees to implement the following specific plan:  Use moisturizer like Eucerin,Cerave or Aveeno Cream 3 times a day for the dry skin            Dry skin refers to skin that feels dry to touch. Dry skin has a dull surface with a rough, scaly quality. The skin is less pliable and cracked. When dryness is severe, the skin may become inflamed and fissured.  Although any body site can be dry, dry skin tends to affect the shins more than any other site.    Dry skin is lacking moisture in the outer horny cell layer (stratum corneum) and this " results in cracks in the skin surface.  Dry skin is also called xerosis, xeroderma or asteatosis (lack of fat).  It can affect males and females of all ages. There is some racial variability in water and lipid content of the skin.  Dry skin that starts in early childhood may be one of about 20 types of ichthyosis (fish-scale skin). There is often a family history of dry skin.   Dry skin is commonly seen in people with atopic dermatitis.  Nearly everyone > 60 years has dry skin.    Dry skin that begins later may be seen in people with certain diseases and conditions.  Postmenopausal women  Hypothyroidism  Chronic renal disease   Malnutrition and weight loss   Subclinical dermatitis   Treatment with certain drugs such as oral retinoids, diuretics and epidermal growth factor receptor inhibitors      What is the treatment for dry skin?  The mainstay of treatment of dry skin and ichthyosis is moisturisers/emollients. They should be applied liberally and often enough to:  Reduce itch   Improve the barrier function   Prevent entry of irritants, bacteria   Reduce transepidermal water loss.      How can dry skin be prevented?  Eliminate aggravating factors:  Reduce the frequency of bathing.   A humidifier in winter and air conditioner in summer   Compare having a short shower with a prolonged soak in a bath.   Use lukewarm, not hot, water.   Replace standard soap with a substitute such as a synthetic detergent cleanser, water-miscible emollient, bath oil, anti-pruritic tar oil, colloidal oatmeal etc.   Apply an emollient liberally and often, particularly shortly after bathing, and when itchy. The drier the skin, the thicker this should be, especially on the hands.    What is the outlook for dry skin?  A tendency to dry skin may persist life-long, or it may improve once contributing factors are controlled.     Scribe Attestation      I,:  Mallorie Marsh am acting as a scribe while in the presence of the attending  physician.:       I,:  Nathalia Jeronimo MD personally performed the services described in this documentation    as scribed in my presence.:                   [1] No Known Allergies  [2]   Current Outpatient Medications:     Biotin 10 MG TABS, Take 10 mg by mouth daily, Disp: , Rfl:     buPROPion (WELLBUTRIN XL) 300 mg 24 hr tablet, Take 1 tablet by mouth in the morning, Disp: , Rfl:     citalopram (CeleXA) 20 mg tablet, , Disp: , Rfl:     cromolyn (NASALCHROM) 5.2 MG/ACT nasal spray, 1 spray into each nostril daily (Patient not taking: Reported on 3/10/2025), Disp: , Rfl:     Diclofenac Sodium (VOLTAREN) 1 %, Apply 2 g topically 4 (four) times a day, Disp: 240 g, Rfl: 0    fluticasone (FLONASE) 50 mcg/act nasal spray, 1 spray into each nostril daily, Disp: 16 mL, Rfl: 1    multivitamin (THERAGRAN) TABS, Take 1 tablet by mouth daily, Disp: , Rfl:     senna-docusate sodium (SENOKOT S) 8.6-50 mg per tablet, Take 1 tablet by mouth if needed, Disp: , Rfl:     simethicone (MYLICON,GAS-X) 125 MG CAPS, Take 1 capsule (125 mg total) by mouth 4 (four) times a day as needed for flatulence (bloating), Disp: 28 capsule, Rfl: 0    traZODone (DESYREL) 50 mg tablet, Take 25-50 mg by mouth daily (Patient not taking: Reported on 3/31/2025), Disp: , Rfl:      un

## 2025-06-05 ENCOUNTER — OFFICE VISIT (OUTPATIENT)
Dept: FAMILY MEDICINE CLINIC | Facility: CLINIC | Age: 45
End: 2025-06-05
Payer: COMMERCIAL

## 2025-06-05 VITALS
SYSTOLIC BLOOD PRESSURE: 108 MMHG | WEIGHT: 134 LBS | DIASTOLIC BLOOD PRESSURE: 64 MMHG | OXYGEN SATURATION: 97 % | TEMPERATURE: 97.8 F | HEIGHT: 64 IN | HEART RATE: 77 BPM | BODY MASS INDEX: 22.88 KG/M2

## 2025-06-05 DIAGNOSIS — Z90.3 HISTORY OF SLEEVE GASTRECTOMY: ICD-10-CM

## 2025-06-05 DIAGNOSIS — Z98.84 S/P GASTRIC BYPASS: ICD-10-CM

## 2025-06-05 PROCEDURE — 99214 OFFICE O/P EST MOD 30 MIN: CPT | Performed by: FAMILY MEDICINE

## 2025-06-05 RX ORDER — SEMAGLUTIDE 1.7 MG/.75ML
1.7 INJECTION, SOLUTION SUBCUTANEOUS WEEKLY
COMMUNITY
End: 2025-06-05 | Stop reason: SDUPTHER

## 2025-06-05 RX ORDER — SEMAGLUTIDE 1.7 MG/.75ML
1.7 INJECTION, SOLUTION SUBCUTANEOUS WEEKLY
Qty: 3 ML | Refills: 2 | Status: SHIPPED | OUTPATIENT
Start: 2025-06-05

## 2025-06-05 NOTE — PROGRESS NOTES
"Name: Jada Ross      : 1980      MRN: [id number]  Encounter Provider: Olimpia Jackson DO  Encounter Date: 2025   Encounter department: [facility] [address]  :  Assessment & Plan  BMI 23.0-23.9, adult  Weight has plateaued, down 48 lbs with Wegovy. Continue with 1.7 mg weekly for maintenance and weight management.   Orders:  •  Semaglutide-Weight Management (Wegovy) 1.7 MG/0.75ML; Inject 0.75 mL (1.7 mg total) under the skin once a week    History of sleeve gastrectomy    Orders:  •  Semaglutide-Weight Management (Wegovy) 1.7 MG/0.75ML; Inject 0.75 mL (1.7 mg total) under the skin once a week    S/P gastric bypass  After revision from gastric sleeve.               History of Present Illness   HPI    Patient presents to the office for weight follow up. States that she is doing well, she is tolerating the medication well without side effects. States that she continues with healthier choices and smaller portions. Exercising 5 days per week, cardio and strength training. Working on increasing protein in diet. Has been taking magnesium supplement, this has helped a lot with her bowels.      lbs   lbs    Review of Systems    Objective   /64   Pulse 77   Temp 97.8 °F (36.6 °C)   Ht 5' 4\" (1.626 m)   Wt 60.8 kg (134 lb)   SpO2 97%   BMI 23.00 kg/m²      Physical Exam  Vitals reviewed.   Constitutional:       General: She is not in acute distress.     Appearance: Normal appearance.   HENT:      Head: Normocephalic and atraumatic.      Right Ear: External ear normal.      Left Ear: External ear normal.      Nose: Nose normal.      Mouth/Throat:      Mouth: Mucous membranes are moist.     Eyes:      Extraocular Movements: Extraocular movements intact.      Conjunctiva/sclera: Conjunctivae normal.       Cardiovascular:      Rate and Rhythm: Normal rate and regular rhythm.      Heart sounds: Normal heart sounds.   Pulmonary:      Effort: " Pulmonary effort is normal.      Breath sounds: Normal breath sounds.   Abdominal:      General: Bowel sounds are normal. There is no distension.      Palpations: Abdomen is soft.      Tenderness: There is no abdominal tenderness.     Musculoskeletal:      Right lower leg: No edema.      Left lower leg: No edema.     Skin:     General: Skin is warm.      Capillary Refill: Capillary refill takes less than 2 seconds.      Findings: No rash.     Neurological:      Mental Status: She is alert. Mental status is at baseline.           DO Lv Quinn [facility][1]  6/5/2025 7:16 AM

## 2025-06-05 NOTE — ASSESSMENT & PLAN NOTE
Orders:  •  Semaglutide-Weight Management (Wegovy) 1.7 MG/0.75ML; Inject 0.75 mL (1.7 mg total) under the skin once a week

## 2025-07-02 DIAGNOSIS — L70.0 ACNE VULGARIS: ICD-10-CM

## 2025-07-02 RX ORDER — DOXYCYCLINE 50 MG/1
50 TABLET ORAL 2 TIMES DAILY
Qty: 60 TABLET | Refills: 0 | OUTPATIENT
Start: 2025-07-02

## 2025-07-03 ENCOUNTER — OFFICE VISIT (OUTPATIENT)
Dept: FAMILY MEDICINE CLINIC | Facility: CLINIC | Age: 45
End: 2025-07-03
Payer: COMMERCIAL

## 2025-07-03 ENCOUNTER — APPOINTMENT (OUTPATIENT)
Age: 45
End: 2025-07-03
Attending: FAMILY MEDICINE
Payer: COMMERCIAL

## 2025-07-03 VITALS
WEIGHT: 126 LBS | OXYGEN SATURATION: 100 % | HEIGHT: 64 IN | SYSTOLIC BLOOD PRESSURE: 110 MMHG | HEART RATE: 81 BPM | TEMPERATURE: 97.2 F | DIASTOLIC BLOOD PRESSURE: 64 MMHG | BODY MASS INDEX: 21.51 KG/M2

## 2025-07-03 DIAGNOSIS — R10.13 EPIGASTRIC PAIN: ICD-10-CM

## 2025-07-03 DIAGNOSIS — R14.0 ABDOMINAL BLOATING: ICD-10-CM

## 2025-07-03 DIAGNOSIS — Z98.84 S/P GASTRIC BYPASS: Primary | ICD-10-CM

## 2025-07-03 LAB
ALBUMIN SERPL BCG-MCNC: 4.4 G/DL (ref 3.5–5)
ALP SERPL-CCNC: 66 U/L (ref 34–104)
ALT SERPL W P-5'-P-CCNC: 28 U/L (ref 7–52)
ANION GAP SERPL CALCULATED.3IONS-SCNC: 8 MMOL/L (ref 4–13)
AST SERPL W P-5'-P-CCNC: 18 U/L (ref 13–39)
BASOPHILS # BLD AUTO: 0.05 THOUSANDS/ÂΜL (ref 0–0.1)
BASOPHILS NFR BLD AUTO: 1 % (ref 0–1)
BILIRUB SERPL-MCNC: 0.79 MG/DL (ref 0.2–1)
BUN SERPL-MCNC: 13 MG/DL (ref 5–25)
CALCIUM SERPL-MCNC: 9.1 MG/DL (ref 8.4–10.2)
CHLORIDE SERPL-SCNC: 100 MMOL/L (ref 96–108)
CO2 SERPL-SCNC: 29 MMOL/L (ref 21–32)
CREAT SERPL-MCNC: 0.69 MG/DL (ref 0.6–1.3)
EOSINOPHIL # BLD AUTO: 0.04 THOUSAND/ÂΜL (ref 0–0.61)
EOSINOPHIL NFR BLD AUTO: 1 % (ref 0–6)
ERYTHROCYTE [DISTWIDTH] IN BLOOD BY AUTOMATED COUNT: 12.3 % (ref 11.6–15.1)
GFR SERPL CREATININE-BSD FRML MDRD: 107 ML/MIN/1.73SQ M
GLUCOSE P FAST SERPL-MCNC: 73 MG/DL (ref 65–99)
HCT VFR BLD AUTO: 37.1 % (ref 34.8–46.1)
HGB BLD-MCNC: 12.4 G/DL (ref 11.5–15.4)
IMM GRANULOCYTES # BLD AUTO: 0.01 THOUSAND/UL (ref 0–0.2)
IMM GRANULOCYTES NFR BLD AUTO: 0 % (ref 0–2)
LIPASE SERPL-CCNC: 74 U/L (ref 11–82)
LYMPHOCYTES # BLD AUTO: 2 THOUSANDS/ÂΜL (ref 0.6–4.47)
LYMPHOCYTES NFR BLD AUTO: 44 % (ref 14–44)
MCH RBC QN AUTO: 30 PG (ref 26.8–34.3)
MCHC RBC AUTO-ENTMCNC: 33.4 G/DL (ref 31.4–37.4)
MCV RBC AUTO: 90 FL (ref 82–98)
MONOCYTES # BLD AUTO: 0.34 THOUSAND/ÂΜL (ref 0.17–1.22)
MONOCYTES NFR BLD AUTO: 8 % (ref 4–12)
NEUTROPHILS # BLD AUTO: 2.1 THOUSANDS/ÂΜL (ref 1.85–7.62)
NEUTS SEG NFR BLD AUTO: 46 % (ref 43–75)
NRBC BLD AUTO-RTO: 0 /100 WBCS
PLATELET # BLD AUTO: 250 THOUSANDS/UL (ref 149–390)
PMV BLD AUTO: 10.9 FL (ref 8.9–12.7)
POTASSIUM SERPL-SCNC: 3.6 MMOL/L (ref 3.5–5.3)
PROT SERPL-MCNC: 6.7 G/DL (ref 6.4–8.4)
RBC # BLD AUTO: 4.14 MILLION/UL (ref 3.81–5.12)
SODIUM SERPL-SCNC: 137 MMOL/L (ref 135–147)
WBC # BLD AUTO: 4.54 THOUSAND/UL (ref 4.31–10.16)

## 2025-07-03 PROCEDURE — 85025 COMPLETE CBC W/AUTO DIFF WBC: CPT

## 2025-07-03 PROCEDURE — 36415 COLL VENOUS BLD VENIPUNCTURE: CPT

## 2025-07-03 PROCEDURE — 99214 OFFICE O/P EST MOD 30 MIN: CPT | Performed by: FAMILY MEDICINE

## 2025-07-03 PROCEDURE — 83690 ASSAY OF LIPASE: CPT

## 2025-07-03 PROCEDURE — 80053 COMPREHEN METABOLIC PANEL: CPT

## 2025-07-03 RX ORDER — PANTOPRAZOLE SODIUM 40 MG/1
40 TABLET, DELAYED RELEASE ORAL DAILY
Qty: 30 TABLET | Refills: 0 | Status: SHIPPED | OUTPATIENT
Start: 2025-07-03

## 2025-07-03 NOTE — PROGRESS NOTES
Name: Jada Ross      : 1980      MRN: [id number]  Encounter Provider: Olimpia Jackson DO  Encounter Date: 7/3/2025   Encounter department: [facility] [address]  :  Assessment & Plan  S/P gastric bypass    Orders:  •  Ambulatory Referral to Gastroenterology; Future  •  NM gastric emptying; Future  •  pantoprazole (PROTONIX) 40 mg tablet; Take 1 tablet (40 mg total) by mouth daily    Epigastric pain    Orders:  •  Ambulatory Referral to Gastroenterology; Future  •  NM gastric emptying; Future  •  CBC and differential; Future  •  Comprehensive metabolic panel; Future  •  Lipase; Future  •  H. pylori antigen, stool; Future  •  pantoprazole (PROTONIX) 40 mg tablet; Take 1 tablet (40 mg total) by mouth daily    Abdominal bloating    Orders:  •  Ambulatory Referral to Gastroenterology; Future  •  NM gastric emptying; Future  •  CBC and differential; Future  •  Comprehensive metabolic panel; Future  •  Lipase; Future  •  pantoprazole (PROTONIX) 40 mg tablet; Take 1 tablet (40 mg total) by mouth daily           History of Present Illness   Abdominal Pain  This is a recurrent problem. The current episode started 1 to 4 weeks ago. The onset quality is sudden. The problem occurs constantly. The most recent episode lasted 2 weeks. The problem has been gradually worsening. The pain is located in the RUQ and epigastric region. The pain is at a severity of 8/10. The quality of the pain is cramping and a sensation of fullness. The abdominal pain radiates to the back. Associated symptoms include anorexia, belching, flatus, nausea and weight loss. Pertinent negatives include no arthralgias, constipation, diarrhea, dysuria, fever, frequency, headaches, hematochezia, hematuria, melena, myalgias or vomiting. The pain is aggravated by eating. The pain is relieved by Nothing and certain positions. Prior diagnostic workup includes ultrasound.     Stopped Wegovy about 3 weeks ago.  "Has used Gasex without help. Tried no dairy, made no different.     States that everything including water makes her feel bloated and cramping. States that this gets worse throughout the day as she eats/drinks. Mild nausea from the pain. Denies vomiting.     Notes that the pain is located in the epigastric area sn travels around to the back and into the lower back.     She is having Bms every morning, formed, soft, brown stools. Denies blood in the stool.     Denies urinary or vaginal symptoms.     History of revision to gastric bypass in 4/2022.    Had gallbladder removed in 10/2024    Review of Systems   Constitutional:  Positive for weight loss. Negative for fever.   Gastrointestinal:  Positive for abdominal pain, anorexia, flatus and nausea. Negative for constipation, diarrhea, hematochezia, melena and vomiting.   Genitourinary:  Negative for dysuria, frequency and hematuria.   Musculoskeletal:  Negative for arthralgias and myalgias.   Neurological:  Negative for headaches.       Objective   /64   Pulse 81   Temp (!) 97.2 °F (36.2 °C)   Ht 5' 4\" (1.626 m)   Wt 57.2 kg (126 lb)   SpO2 100%   BMI 21.63 kg/m²      Physical Exam  Vitals reviewed.   Constitutional:       General: She is not in acute distress.     Appearance: Normal appearance.   HENT:      Head: Normocephalic and atraumatic.      Right Ear: External ear normal.      Left Ear: External ear normal.      Nose: Nose normal.      Mouth/Throat:      Mouth: Mucous membranes are moist.     Eyes:      Extraocular Movements: Extraocular movements intact.      Conjunctiva/sclera: Conjunctivae normal.      Pupils: Pupils are equal, round, and reactive to light.       Cardiovascular:      Rate and Rhythm: Normal rate and regular rhythm.      Heart sounds: Normal heart sounds.   Pulmonary:      Effort: Pulmonary effort is normal.      Breath sounds: Normal breath sounds.   Abdominal:      General: Bowel sounds are normal. There is no distension.      " Palpations: Abdomen is soft.      Tenderness: There is no abdominal tenderness.     Musculoskeletal:      Cervical back: Neck supple.      Right lower leg: No edema.      Left lower leg: No edema.   Lymphadenopathy:      Cervical: No cervical adenopathy.     Skin:     General: Skin is warm.      Capillary Refill: Capillary refill takes less than 2 seconds.      Findings: No rash.     Neurological:      Mental Status: She is alert. Mental status is at baseline.           DO Lv Quinn [facility]  7/3/2025 10:08 AM

## 2025-07-03 NOTE — ASSESSMENT & PLAN NOTE
Orders:  •  Ambulatory Referral to Gastroenterology; Future  •  NM gastric emptying; Future  •  pantoprazole (PROTONIX) 40 mg tablet; Take 1 tablet (40 mg total) by mouth daily

## 2025-07-07 ENCOUNTER — APPOINTMENT (OUTPATIENT)
Age: 45
End: 2025-07-07
Payer: COMMERCIAL

## 2025-07-07 ENCOUNTER — OFFICE VISIT (OUTPATIENT)
Dept: GASTROENTEROLOGY | Facility: CLINIC | Age: 45
End: 2025-07-07
Payer: COMMERCIAL

## 2025-07-07 VITALS
OXYGEN SATURATION: 99 % | SYSTOLIC BLOOD PRESSURE: 106 MMHG | HEIGHT: 64 IN | BODY MASS INDEX: 21.34 KG/M2 | WEIGHT: 125 LBS | DIASTOLIC BLOOD PRESSURE: 60 MMHG | HEART RATE: 81 BPM | TEMPERATURE: 97.5 F

## 2025-07-07 DIAGNOSIS — R14.0 ABDOMINAL BLOATING: ICD-10-CM

## 2025-07-07 DIAGNOSIS — R10.13 EPIGASTRIC PAIN: ICD-10-CM

## 2025-07-07 DIAGNOSIS — Z98.84 S/P GASTRIC BYPASS: ICD-10-CM

## 2025-07-07 PROCEDURE — 87338 HPYLORI STOOL AG IA: CPT

## 2025-07-07 PROCEDURE — 99244 OFF/OP CNSLTJ NEW/EST MOD 40: CPT | Performed by: INTERNAL MEDICINE

## 2025-07-07 NOTE — LETTER
2025     Olimpia Jackson DO  [address]  [facility] [address]    Patient: Jada Ross   YOB: 1980   Date of Visit: 2025       Dear Dr. Olimpia Jackson DO:    Thank you for referring Jada Ross to me for evaluation. Below are my notes for this consultation.    If you have questions, please do not hesitate to call me. I look forward to following your patient along with you.         Sincerely,        Higinio Lundberg DO        CC: No Recipients    Higinio Lundberg DO  2025  6:16 AM  Signed  Name: Jada Ross      : 1980      MRN: [id number]  Encounter Provider: Higinio Lundberg DO  Encounter Date: 2025   Encounter department: [facility] [place]    :  Assessment & Plan  S/P gastric bypass    Orders:  •  Ambulatory Referral to Gastroenterology  •  EGD; Future    Epigastric pain    Orders:  •  Ambulatory Referral to Gastroenterology  •  EGD; Future    Abdominal bloating    Orders:  •  Ambulatory Referral to Gastroenterology  •  EGD; Future      Assessment & Plan  1. Upper abdominal pain:  - Symptoms of upper abdominal pain, bloating, and gassiness began in 2025.  - Gastric sleeve surgery in , revision to gastric bypass in 2023, and gallbladder removal in .  - Symptoms persisted despite discontinuing Wegovy in 2025.  - Cancel the gastric emptying study due to limited value given current gastric bypass anatomy.  - Schedule endoscopy for 07/10/2025 at 1:45 PM to investigate potential post-surgical complications.  - Start pantoprazole every morning, beginning 2025.  - RUQ ultrasound performed 2025 reviewed  - Laboratories to include CBC, CMP, and lipase level on 7/3/2025 was reviewed    2. Weight loss:  - Significant weight loss, currently weighing 124 pounds, down from highest weight of 274 pounds.  - Continues to lose about 10 pounds per month after stopping Wegovy.  -  Eat small, frequent meals throughout the day.  - Focus on balancing protein, calories, and fluid intake.    3. Heartburn:  - Reports experiencing a little bit of heartburn.  - Start pantoprazole every morning, beginning 07/08/2025.    Follow-up: 07/10/2025.      History of Present Illness  History of Present Illness  The patient is a 44-year-old female who presents for ongoing upper abdominal pain, bloating, gassiness, and weight loss.    She has a history of bariatric surgery, including a gastric sleeve in 2016, a revision to bypass in 04/2023 due to acid reflux, and gallbladder removal in 2024. Her weight stabilized after these procedures, but she experienced a plateau. In 11/2024, she weighed 182 pounds and started Wegovy. However, she discontinued it in 06/2025 due to the onset of stomach pain in 04/2025. Despite stopping Wegovy, her symptoms have not improved. She initially thought the symptoms were due to an increased dose of Wegovy, so she reduced the dosage. She also started taking magnesium for constipation, which provided relief for a few weeks before the symptoms returned. This led her to stop Wegovy completely. She continues to take magnesium and has regular bowel movements.    Her symptoms include pain, bloating, and gas, which started around 04/2025. She experiences severe bloating after meals, even though she eats small portions due to her gastric bypass anatomy. She reports no nausea, vomiting, constipation, or diarrhea. She experiences mild heartburn and was prescribed pantoprazole by her primary care physician, but was advised to start it only after a scheduled gastric emptying study on 07/15/2025. She describes her stomach as being very loud. Her highest recorded weight was 274 pounds, and she currently weighs 124 pounds. She has lost about 10 pounds since stopping Wegovy. She tries to eat small meals throughout the day, but struggles to balance her protein, calorie, and fluid intake due to  "feeling full and bloated. She spends about an hour belching and burping after drinking water. She recalls being able to eat more after her bypass surgery, but now finds it difficult to consume even small amounts. She has not started taking pantoprazole yet.    Right upper quadrant ultrasound was performed on 4/1/2025 demonstrating no acute pathology.  There was a 0.3 cm echogenic focus in the upper pole of the right kidney possibly representing a vascular calcification.  The common bile duct measured 5 mm in diameter without evidence of choledocholithiasis.  There were no intrahepatic biliary dilation.  The overall appearance to the liver was normal.      Lipase level performed in 7/3/2025 was normal.Comprehensive metabolic panel performed on 7/3/2025 was completely normal.  Her CBC on 7/3/2025 was normal.    PAST SURGICAL HISTORY:  Gastric sleeve (2016)  Revision to bypass (04/2023)  Gallbladder removal (2024)    SOCIAL HISTORY  Diet: Consumes small amounts of food due to gastric bypass anatomy. Typically eats snacks five times a day. Breakfast usually consists of a banana and 16 ounces of water. Dinner includes small portions of grilled chicken and rice.       Objective  /60 (BP Location: Left arm, Patient Position: Sitting, Cuff Size: Standard)   Pulse 81   Temp 97.5 °F (36.4 °C) (Temporal)   Ht 5' 4\" (1.626 m)   Wt 56.7 kg (125 lb)   SpO2 99%   BMI 21.46 kg/m²     Physical Exam  Vital Signs: Weight is 124 pounds.  Physical Exam  Vitals reviewed.   Constitutional:       General: She is not in acute distress.     Appearance: Normal appearance. She is not ill-appearing.     Eyes:      General: No scleral icterus.     Extraocular Movements: Extraocular movements intact.      Pupils: Pupils are equal, round, and reactive to light.       Cardiovascular:      Rate and Rhythm: Normal rate and regular rhythm.      Pulses: Normal pulses.      Heart sounds: Normal heart sounds. No murmur heard.  Pulmonary:      " Effort: Pulmonary effort is normal.      Breath sounds: Normal breath sounds. No wheezing, rhonchi or rales.   Abdominal:      Palpations: Abdomen is soft. There is no mass.      Tenderness: There is no abdominal tenderness. There is no guarding or rebound.     Musculoskeletal:      Right lower leg: No edema.      Left lower leg: No edema.     Skin:     General: Skin is warm and dry.      Coloration: Skin is not jaundiced.      Findings: No rash.     Neurological:      General: No focal deficit present.      Mental Status: She is alert and oriented to person, place, and time.     Psychiatric:         Mood and Affect: Mood normal.         Behavior: Behavior normal.

## 2025-07-07 NOTE — PATIENT INSTRUCTIONS
Scheduled date of EGD(as of today):7/10/25  Physician performing EGD:Toño  Location of EGD:[place]  Instructions reviewed with patient by:[place]  Clearances:  none

## 2025-07-08 NOTE — PROGRESS NOTES
Name: Jada Ross      : 1980      MRN: [id number]  Encounter Provider: Higinio Lundberg DO  Encounter Date: 2025   Encounter department: [facility] [place]    :  Assessment & Plan  S/P gastric bypass    Orders:    Ambulatory Referral to Gastroenterology    EGD; Future    Epigastric pain    Orders:    Ambulatory Referral to Gastroenterology    EGD; Future    Abdominal bloating    Orders:    Ambulatory Referral to Gastroenterology    EGD; Future      Assessment & Plan  1. Upper abdominal pain:  - Symptoms of upper abdominal pain, bloating, and gassiness began in 2025.  - Gastric sleeve surgery in , revision to gastric bypass in 2023, and gallbladder removal in .  - Symptoms persisted despite discontinuing Wegovy in 2025.  - Cancel the gastric emptying study due to limited value given current gastric bypass anatomy.  - Schedule endoscopy for 07/10/2025 at 1:45 PM to investigate potential post-surgical complications.  - Start pantoprazole every morning, beginning 2025.  - RUQ ultrasound performed 2025 reviewed  - Laboratories to include CBC, CMP, and lipase level on 7/3/2025 was reviewed    2. Weight loss:  - Significant weight loss, currently weighing 124 pounds, down from highest weight of 274 pounds.  - Continues to lose about 10 pounds per month after stopping Wegovy.  - Eat small, frequent meals throughout the day.  - Focus on balancing protein, calories, and fluid intake.    3. Heartburn:  - Reports experiencing a little bit of heartburn.  - Start pantoprazole every morning, beginning 2025.    Follow-up: 07/10/2025.      History of Present Illness   History of Present Illness  The patient is a 44-year-old female who presents for ongoing upper abdominal pain, bloating, gassiness, and weight loss.    She has a history of bariatric surgery, including a gastric sleeve in , a revision to bypass in 2023 due to acid  reflux, and gallbladder removal in 2024. Her weight stabilized after these procedures, but she experienced a plateau. In 11/2024, she weighed 182 pounds and started Wegovy. However, she discontinued it in 06/2025 due to the onset of stomach pain in 04/2025. Despite stopping Wegovy, her symptoms have not improved. She initially thought the symptoms were due to an increased dose of Wegovy, so she reduced the dosage. She also started taking magnesium for constipation, which provided relief for a few weeks before the symptoms returned. This led her to stop Wegovy completely. She continues to take magnesium and has regular bowel movements.    Her symptoms include pain, bloating, and gas, which started around 04/2025. She experiences severe bloating after meals, even though she eats small portions due to her gastric bypass anatomy. She reports no nausea, vomiting, constipation, or diarrhea. She experiences mild heartburn and was prescribed pantoprazole by her primary care physician, but was advised to start it only after a scheduled gastric emptying study on 07/15/2025. She describes her stomach as being very loud. Her highest recorded weight was 274 pounds, and she currently weighs 124 pounds. She has lost about 10 pounds since stopping Wegovy. She tries to eat small meals throughout the day, but struggles to balance her protein, calorie, and fluid intake due to feeling full and bloated. She spends about an hour belching and burping after drinking water. She recalls being able to eat more after her bypass surgery, but now finds it difficult to consume even small amounts. She has not started taking pantoprazole yet.    Right upper quadrant ultrasound was performed on 4/1/2025 demonstrating no acute pathology.  There was a 0.3 cm echogenic focus in the upper pole of the right kidney possibly representing a vascular calcification.  The common bile duct measured 5 mm in diameter without evidence of choledocholithiasis.   "There were no intrahepatic biliary dilation.  The overall appearance to the liver was normal.      Lipase level performed in 7/3/2025 was normal.Comprehensive metabolic panel performed on 7/3/2025 was completely normal.  Her CBC on 7/3/2025 was normal.    PAST SURGICAL HISTORY:  Gastric sleeve (2016)  Revision to bypass (04/2023)  Gallbladder removal (2024)    SOCIAL HISTORY  Diet: Consumes small amounts of food due to gastric bypass anatomy. Typically eats snacks five times a day. Breakfast usually consists of a banana and 16 ounces of water. Dinner includes small portions of grilled chicken and rice.       Objective   /60 (BP Location: Left arm, Patient Position: Sitting, Cuff Size: Standard)   Pulse 81   Temp 97.5 °F (36.4 °C) (Temporal)   Ht 5' 4\" (1.626 m)   Wt 56.7 kg (125 lb)   SpO2 99%   BMI 21.46 kg/m²     Physical Exam  Vital Signs: Weight is 124 pounds.  Physical Exam  Vitals reviewed.   Constitutional:       General: She is not in acute distress.     Appearance: Normal appearance. She is not ill-appearing.     Eyes:      General: No scleral icterus.     Extraocular Movements: Extraocular movements intact.      Pupils: Pupils are equal, round, and reactive to light.       Cardiovascular:      Rate and Rhythm: Normal rate and regular rhythm.      Pulses: Normal pulses.      Heart sounds: Normal heart sounds. No murmur heard.  Pulmonary:      Effort: Pulmonary effort is normal.      Breath sounds: Normal breath sounds. No wheezing, rhonchi or rales.   Abdominal:      Palpations: Abdomen is soft. There is no mass.      Tenderness: There is no abdominal tenderness. There is no guarding or rebound.     Musculoskeletal:      Right lower leg: No edema.      Left lower leg: No edema.     Skin:     General: Skin is warm and dry.      Coloration: Skin is not jaundiced.      Findings: No rash.     Neurological:      General: No focal deficit present.      Mental Status: She is alert and oriented to " person, place, and time.     Psychiatric:         Mood and Affect: Mood normal.         Behavior: Behavior normal.

## 2025-07-10 ENCOUNTER — HOSPITAL ENCOUNTER (OUTPATIENT)
Dept: GASTROENTEROLOGY | Facility: HOSPITAL | Age: 45
Setting detail: OUTPATIENT SURGERY
End: 2025-07-10
Attending: INTERNAL MEDICINE
Payer: COMMERCIAL

## 2025-07-10 ENCOUNTER — ANESTHESIA (OUTPATIENT)
Dept: GASTROENTEROLOGY | Facility: HOSPITAL | Age: 45
End: 2025-07-10
Payer: COMMERCIAL

## 2025-07-10 ENCOUNTER — ANESTHESIA EVENT (OUTPATIENT)
Dept: GASTROENTEROLOGY | Facility: HOSPITAL | Age: 45
End: 2025-07-10
Payer: COMMERCIAL

## 2025-07-10 VITALS
WEIGHT: 130.07 LBS | OXYGEN SATURATION: 100 % | RESPIRATION RATE: 16 BRPM | TEMPERATURE: 97.3 F | BODY MASS INDEX: 22.21 KG/M2 | HEIGHT: 64 IN | SYSTOLIC BLOOD PRESSURE: 103 MMHG | DIASTOLIC BLOOD PRESSURE: 65 MMHG | HEART RATE: 63 BPM

## 2025-07-10 DIAGNOSIS — Z98.84 S/P GASTRIC BYPASS: ICD-10-CM

## 2025-07-10 DIAGNOSIS — R10.13 EPIGASTRIC PAIN: ICD-10-CM

## 2025-07-10 DIAGNOSIS — R14.0 ABDOMINAL BLOATING: ICD-10-CM

## 2025-07-10 LAB
EXT PREGNANCY TEST URINE: NEGATIVE
EXT. CONTROL: NORMAL

## 2025-07-10 PROCEDURE — 43239 EGD BIOPSY SINGLE/MULTIPLE: CPT | Performed by: INTERNAL MEDICINE

## 2025-07-10 PROCEDURE — 81025 URINE PREGNANCY TEST: CPT | Performed by: ANESTHESIOLOGY

## 2025-07-10 PROCEDURE — 88305 TISSUE EXAM BY PATHOLOGIST: CPT | Performed by: PATHOLOGY

## 2025-07-10 RX ORDER — PROPOFOL 10 MG/ML
INJECTION, EMULSION INTRAVENOUS AS NEEDED
Status: DISCONTINUED | OUTPATIENT
Start: 2025-07-10 | End: 2025-07-10

## 2025-07-10 RX ORDER — LIDOCAINE HYDROCHLORIDE 20 MG/ML
INJECTION, SOLUTION EPIDURAL; INFILTRATION; INTRACAUDAL; PERINEURAL AS NEEDED
Status: DISCONTINUED | OUTPATIENT
Start: 2025-07-10 | End: 2025-07-10

## 2025-07-10 RX ORDER — SODIUM CHLORIDE, SODIUM LACTATE, POTASSIUM CHLORIDE, CALCIUM CHLORIDE 600; 310; 30; 20 MG/100ML; MG/100ML; MG/100ML; MG/100ML
INJECTION, SOLUTION INTRAVENOUS CONTINUOUS PRN
Status: DISCONTINUED | OUTPATIENT
Start: 2025-07-10 | End: 2025-07-10

## 2025-07-10 RX ADMIN — PROPOFOL 100 MG: 10 INJECTION, EMULSION INTRAVENOUS at 10:23

## 2025-07-10 RX ADMIN — SODIUM CHLORIDE, SODIUM LACTATE, POTASSIUM CHLORIDE, AND CALCIUM CHLORIDE: .6; .31; .03; .02 INJECTION, SOLUTION INTRAVENOUS at 10:05

## 2025-07-10 RX ADMIN — LIDOCAINE HYDROCHLORIDE 40 MG: 20 INJECTION, SOLUTION EPIDURAL; INFILTRATION; INTRACAUDAL; PERINEURAL at 10:21

## 2025-07-10 RX ADMIN — PROPOFOL 50 MG: 10 INJECTION, EMULSION INTRAVENOUS at 10:28

## 2025-07-10 RX ADMIN — LIDOCAINE HYDROCHLORIDE 40 MG: 20 INJECTION, SOLUTION EPIDURAL; INFILTRATION; INTRACAUDAL; PERINEURAL at 10:23

## 2025-07-10 RX ADMIN — PROPOFOL 50 MG: 10 INJECTION, EMULSION INTRAVENOUS at 10:24

## 2025-07-10 NOTE — H&P
"History and Physical -  [facility]  Jada Ross 44 y.o. female MRN: [id number]      HPI: Jdaa Ross is a 44 y.o. year old female who presents for the evaluation of epigastric abdominal pain and bloating      REVIEW OF SYSTEMS: Per the HPI, and otherwise unremarkable.    Historical Information   Past Medical History[1]  Past Surgical History[2]  Social History   Social History     Substance and Sexual Activity   Alcohol Use Never     Social History     Substance and Sexual Activity   Drug Use Never     Tobacco Use History[3]  Family History[4]    Meds/Allergies     Not in a hospital admission.    Allergies[5]    Objective     Blood pressure 110/62, pulse 60, temperature 97.6 °F (36.4 °C), resp. rate 16, height 5' 4\" (1.626 m), weight 59 kg (130 lb 1.1 oz), last menstrual period 2025, SpO2 100%.      PHYSICAL EXAM    Gen: NAD  CV: RRR  CHEST: Clear  ABD: soft, NT/ND  EXT: no edema      ASSESSMENT/PLAN:  This is a 44 y.o. year old female here for EGD, and she is stable and optimized for her procedure.               [1]   Past Medical History:  Diagnosis Date    Acne     Allergic     Anemia     Aneurysm (HCC) 2020    Anxiety     GERD (gastroesophageal reflux disease)     Obesity     Skin tag    [2]   Past Surgical History:  Procedure Laterality Date    BARIATRIC SURGERY  2016, 2023    BRAIN SURGERY  08/10/2022    Venous sinus stenosis    CAROTID STENT      CEREBRAL ANGIOGRAM       SECTION  2012, 2018    GASTRIC BYPASS  2016    HERNIA REPAIR      REVISION GASTRIC RESTRICTIVE PROCEDURE FOR MORBID OBESITY  2023    VASCULAR SURGERY     [3]   Social History  Tobacco Use   Smoking Status Former    Current packs/day: 0.00    Average packs/day: 1 pack/day for 5.0 years (5.0 ttl pk-yrs)    Types: Cigarettes    Start date: 2005    Quit date: 2010    Years since quitting: 15.5    Passive exposure: Never   Smokeless Tobacco Never   [4] "   Family History  Problem Relation Name Age of Onset    Lung cancer Mother Charanjit 65    Mental illness Mother Charanjit     Depression Mother Charanjit     Coronary artery disease Mother Charanjit     Diabetes Mother Charanjit     COPD Mother Charanjit     Arthritis Mother Charanjit     Anxiety disorder Mother Charanjit     Psychiatric Illness Mother Charanjit     Self-Injury Mother Charanjit     Suicide Attempts Mother Charanjit     Psoriasis Mother Charanjit     Heart disease Father Ron     Diabetes Father Ron     Psychiatric Illness Father Ron     No Known Problems Daughter      No Known Problems Daughter      Lung cancer Maternal Grandmother  66    Breast cancer Paternal Grandmother Brandi 45    No Known Problems Half-Sister      Prostate cancer Maternal Uncle William     Clotting disorder Brother Merrill Lluvia     Melanoma Maternal Aunt Azalia Almodovar    [5] No Known Allergies

## 2025-07-10 NOTE — ANESTHESIA PREPROCEDURE EVALUATION
Procedure:  EGD    Relevant Problems   GI/HEPATIC   (+) Gastroesophageal reflux disease without esophagitis   (+) Hiatal hernia      HEMATOLOGY   (+) Absolute anemia      NEURO/PSYCH   (+) Anxiety and depression      Anemia    Anxiety    GERD (gastroesophageal reflux disease)    Obesity    Aneurysm (HCC)    Allergic    Skin tag    Acne      Physical Exam    Airway     Mallampati score: II  TM Distance: >3 FB  Neck ROM: full      Cardiovascular  Cardiovascular exam normal    Dental       Pulmonary  Pulmonary exam normal     Neurological      Other Findings  post-pubertal.      Anesthesia Plan  ASA Score- 2     Anesthesia Type- IV sedation with anesthesia with ASA Monitors.         Additional Monitors:     Airway Plan:            Plan Factors-Exercise tolerance (METS): >4 METS.    Chart reviewed. EKG reviewed. Imaging results reviewed. Existing labs reviewed. Patient summary reviewed.                  Induction- intravenous.    Postoperative Plan- .   Monitoring Plan - Monitoring plan - standard ASA monitoring          Informed Consent- Anesthetic plan and risks discussed with patient.  I personally reviewed this patient with the CRNA. Discussed and agreed on the Anesthesia Plan with the CRNA..      NPO Status:  No vitals data found for the desired time range.

## 2025-07-10 NOTE — ANESTHESIA POSTPROCEDURE EVALUATION
Post-Op Assessment Note    CV Status:  Stable  Pain Score: 0    Pain management: adequate       Mental Status:  Sleepy and arousable   Hydration Status:  Euvolemic   PONV Controlled:  Controlled   Airway Patency:  Patent     Post Op Vitals Reviewed: Yes    No anethesia notable event occurred.    Staff: CRNA           Last Filed PACU Vitals:  Vitals Value Taken Time   Temp     Pulse 70    /59    Resp 16    SpO2 98% RA

## 2025-07-11 LAB — H PYLORI AG STL QL IA: NEGATIVE

## 2025-07-15 PROCEDURE — 88305 TISSUE EXAM BY PATHOLOGIST: CPT | Performed by: PATHOLOGY

## 2025-07-22 ENCOUNTER — PATIENT MESSAGE (OUTPATIENT)
Dept: DERMATOLOGY | Facility: CLINIC | Age: 45
End: 2025-07-22

## 2025-07-22 DIAGNOSIS — L70.0 ACNE VULGARIS: ICD-10-CM

## 2025-07-23 RX ORDER — DOXYCYCLINE 50 MG/1
50 TABLET ORAL 2 TIMES DAILY
Qty: 180 TABLET | Refills: 0 | Status: SHIPPED | OUTPATIENT
Start: 2025-07-23 | End: 2025-10-21

## 2025-08-08 DIAGNOSIS — R10.13 EPIGASTRIC PAIN: ICD-10-CM

## 2025-08-08 DIAGNOSIS — Z98.84 S/P GASTRIC BYPASS: ICD-10-CM

## 2025-08-08 DIAGNOSIS — R14.0 ABDOMINAL BLOATING: ICD-10-CM

## 2025-08-09 RX ORDER — PANTOPRAZOLE SODIUM 40 MG/1
40 TABLET, DELAYED RELEASE ORAL DAILY
Qty: 30 TABLET | Refills: 0 | Status: SHIPPED | OUTPATIENT
Start: 2025-08-09